# Patient Record
Sex: MALE | Race: WHITE | NOT HISPANIC OR LATINO | Employment: OTHER | URBAN - METROPOLITAN AREA
[De-identification: names, ages, dates, MRNs, and addresses within clinical notes are randomized per-mention and may not be internally consistent; named-entity substitution may affect disease eponyms.]

---

## 2017-01-01 ENCOUNTER — APPOINTMENT (OUTPATIENT)
Dept: GENERAL RADIOLOGY | Facility: HOSPITAL | Age: 62
End: 2017-01-01

## 2017-01-01 PROCEDURE — 71010 HC CHEST PA OR AP: CPT

## 2017-01-02 ENCOUNTER — APPOINTMENT (OUTPATIENT)
Dept: GENERAL RADIOLOGY | Facility: HOSPITAL | Age: 62
End: 2017-01-02

## 2017-01-02 PROCEDURE — 71010 HC CHEST PA OR AP: CPT

## 2017-01-03 ENCOUNTER — APPOINTMENT (OUTPATIENT)
Dept: GENERAL RADIOLOGY | Facility: HOSPITAL | Age: 62
End: 2017-01-03

## 2017-01-03 PROCEDURE — 71010 HC CHEST PA OR AP: CPT

## 2017-01-09 ENCOUNTER — TELEPHONE (OUTPATIENT)
Dept: CARDIAC SURGERY | Facility: CLINIC | Age: 62
End: 2017-01-09

## 2017-01-09 NOTE — TELEPHONE ENCOUNTER
I called Mr Bloom today to cancel his appointment in [facility] tomorrow because Dr De La Fuente will not be going because he has to do surgery in [place] tomorrow. I explained to him that Dr De La Fuente is not going to the [facility] anymore and offered to make him a [place] appointment. He was upset and said that this was the third time Dr De La Fuente had done this to him and that he was too sick right now to travel to [place]. He does not wish to make anymore appointments with Dr De La Fuente at this time. ls

## 2017-01-14 ENCOUNTER — APPOINTMENT (OUTPATIENT)
Dept: GENERAL RADIOLOGY | Facility: HOSPITAL | Age: 62
End: 2017-01-14

## 2017-01-14 ENCOUNTER — HOSPITAL ENCOUNTER (INPATIENT)
Facility: HOSPITAL | Age: 62
LOS: 7 days | Discharge: HOME | End: 2017-01-21
Attending: THORACIC SURGERY (CARDIOTHORACIC VASCULAR SURGERY) | Admitting: THORACIC SURGERY (CARDIOTHORACIC VASCULAR SURGERY)

## 2017-01-14 ENCOUNTER — ANESTHESIA EVENT (OUTPATIENT)
Dept: PERIOP | Facility: HOSPITAL | Age: 62
End: 2017-01-14

## 2017-01-14 DIAGNOSIS — J93.83 PNEUMOTHORAX, ACUTE: Primary | ICD-10-CM

## 2017-01-14 PROBLEM — J93.9 PNEUMOTHORAX: Status: ACTIVE | Noted: 2017-01-14

## 2017-01-14 LAB
ANION GAP SERPL CALCULATED.3IONS-SCNC: 8 MMOL/L (ref 3–11)
APTT PPP: 29.3 SECONDS (ref 24–31)
BASOPHILS # BLD AUTO: 0.02 10*3/MM3 (ref 0–0.2)
BASOPHILS NFR BLD AUTO: 0.2 % (ref 0–1)
BUN BLD-MCNC: 14 MG/DL (ref 9–23)
BUN/CREAT SERPL: 17.5 (ref 7–25)
CALCIUM SPEC-SCNC: 9.5 MG/DL (ref 8.7–10.4)
CHLORIDE SERPL-SCNC: 103 MMOL/L (ref 99–109)
CO2 SERPL-SCNC: 32 MMOL/L (ref 20–31)
CREAT BLD-MCNC: 0.8 MG/DL (ref 0.6–1.3)
D-LACTATE SERPL-SCNC: 0.8 MMOL/L (ref 0.5–2)
DEPRECATED RDW RBC AUTO: 54.9 FL (ref 37–54)
EOSINOPHIL # BLD AUTO: 0.43 10*3/MM3 (ref 0.1–0.3)
EOSINOPHIL NFR BLD AUTO: 3.9 % (ref 0–3)
ERYTHROCYTE [DISTWIDTH] IN BLOOD BY AUTOMATED COUNT: 15.8 % (ref 11.3–14.5)
GFR SERPL CREATININE-BSD FRML MDRD: 98 ML/MIN/1.73
GLUCOSE BLD-MCNC: 130 MG/DL (ref 70–100)
GLUCOSE BLDC GLUCOMTR-MCNC: 136 MG/DL (ref 70–130)
GLUCOSE BLDC GLUCOMTR-MCNC: 157 MG/DL (ref 70–130)
GLUCOSE BLDC GLUCOMTR-MCNC: 159 MG/DL (ref 70–130)
GLUCOSE BLDC GLUCOMTR-MCNC: 188 MG/DL (ref 70–130)
HBA1C MFR BLD: 7 % (ref 4.8–5.6)
HCT VFR BLD AUTO: 37.9 % (ref 38.9–50.9)
HGB BLD-MCNC: 12.2 G/DL (ref 13.1–17.5)
IMM GRANULOCYTES # BLD: 0.17 10*3/MM3 (ref 0–0.03)
IMM GRANULOCYTES NFR BLD: 1.6 % (ref 0–0.6)
INR PPP: 0.96
LYMPHOCYTES # BLD AUTO: 1.39 10*3/MM3 (ref 0.6–4.8)
LYMPHOCYTES NFR BLD AUTO: 12.7 % (ref 24–44)
MAGNESIUM SERPL-MCNC: 2 MG/DL (ref 1.3–2.7)
MCH RBC QN AUTO: 30.7 PG (ref 27–31)
MCHC RBC AUTO-ENTMCNC: 32.2 G/DL (ref 32–36)
MCV RBC AUTO: 95.2 FL (ref 80–99)
MONOCYTES # BLD AUTO: 0.55 10*3/MM3 (ref 0–1)
MONOCYTES NFR BLD AUTO: 5 % (ref 0–12)
NEUTROPHILS # BLD AUTO: 8.39 10*3/MM3 (ref 1.5–8.3)
NEUTROPHILS NFR BLD AUTO: 76.6 % (ref 41–71)
PHOSPHATE SERPL-MCNC: 3.5 MG/DL (ref 2.4–5.1)
PLATELET # BLD AUTO: 259 10*3/MM3 (ref 150–450)
PMV BLD AUTO: 10.2 FL (ref 6–12)
POTASSIUM BLD-SCNC: 4.1 MMOL/L (ref 3.5–5.5)
PROCALCITONIN SERPL-MCNC: 0.09 NG/ML
PROTHROMBIN TIME: 10.5 SECONDS (ref 9.6–11.5)
RBC # BLD AUTO: 3.98 10*6/MM3 (ref 4.2–5.76)
SODIUM BLD-SCNC: 143 MMOL/L (ref 132–146)
TSH SERPL DL<=0.05 MIU/L-ACNC: 1.41 MIU/ML (ref 0.35–5.35)
WBC NRBC COR # BLD: 10.95 10*3/MM3 (ref 3.5–10.8)

## 2017-01-14 PROCEDURE — 63710000001 INSULIN LISPRO (HUMAN) PER 5 UNITS: Performed by: INTERNAL MEDICINE

## 2017-01-14 PROCEDURE — 83605 ASSAY OF LACTIC ACID: CPT | Performed by: INTERNAL MEDICINE

## 2017-01-14 PROCEDURE — 84100 ASSAY OF PHOSPHORUS: CPT | Performed by: NURSE PRACTITIONER

## 2017-01-14 PROCEDURE — 85610 PROTHROMBIN TIME: CPT | Performed by: PHYSICIAN ASSISTANT

## 2017-01-14 PROCEDURE — 85730 THROMBOPLASTIN TIME PARTIAL: CPT | Performed by: PHYSICIAN ASSISTANT

## 2017-01-14 PROCEDURE — 25810000003 DEXTROSE-NACL PER 500 ML: Performed by: INTERNAL MEDICINE

## 2017-01-14 PROCEDURE — 85025 COMPLETE CBC W/AUTO DIFF WBC: CPT | Performed by: PHYSICIAN ASSISTANT

## 2017-01-14 PROCEDURE — 84443 ASSAY THYROID STIM HORMONE: CPT | Performed by: INTERNAL MEDICINE

## 2017-01-14 PROCEDURE — 25010000002 MORPHINE SULFATE (PF) 2 MG/ML SOLUTION: Performed by: PHYSICIAN ASSISTANT

## 2017-01-14 PROCEDURE — 80048 BASIC METABOLIC PNL TOTAL CA: CPT | Performed by: PHYSICIAN ASSISTANT

## 2017-01-14 PROCEDURE — 83735 ASSAY OF MAGNESIUM: CPT | Performed by: NURSE PRACTITIONER

## 2017-01-14 PROCEDURE — 84145 PROCALCITONIN (PCT): CPT | Performed by: INTERNAL MEDICINE

## 2017-01-14 PROCEDURE — 93010 ELECTROCARDIOGRAM REPORT: CPT | Performed by: INTERNAL MEDICINE

## 2017-01-14 PROCEDURE — 83036 HEMOGLOBIN GLYCOSYLATED A1C: CPT | Performed by: INTERNAL MEDICINE

## 2017-01-14 PROCEDURE — 25010000002 MORPHINE PER 10 MG: Performed by: PHYSICIAN ASSISTANT

## 2017-01-14 PROCEDURE — 82962 GLUCOSE BLOOD TEST: CPT

## 2017-01-14 PROCEDURE — 25010000002 PIPERACILLIN SOD-TAZOBACTAM PER 1 G: Performed by: INTERNAL MEDICINE

## 2017-01-14 PROCEDURE — 71010 HC CHEST PA OR AP: CPT

## 2017-01-14 PROCEDURE — 87040 BLOOD CULTURE FOR BACTERIA: CPT | Performed by: INTERNAL MEDICINE

## 2017-01-14 PROCEDURE — 93005 ELECTROCARDIOGRAM TRACING: CPT | Performed by: PHYSICIAN ASSISTANT

## 2017-01-14 PROCEDURE — 99223 1ST HOSP IP/OBS HIGH 75: CPT | Performed by: INTERNAL MEDICINE

## 2017-01-14 RX ORDER — DEXTROSE MONOHYDRATE 25 G/50ML
25 INJECTION, SOLUTION INTRAVENOUS
Status: CANCELLED | OUTPATIENT
Start: 2017-01-14

## 2017-01-14 RX ORDER — DEXTROSE AND SODIUM CHLORIDE 5; .9 G/100ML; G/100ML
100 INJECTION, SOLUTION INTRAVENOUS CONTINUOUS
Status: DISCONTINUED | OUTPATIENT
Start: 2017-01-14 | End: 2017-01-16

## 2017-01-14 RX ORDER — HYDROCODONE BITARTRATE AND ACETAMINOPHEN 7.5; 325 MG/1; MG/1
1 TABLET ORAL EVERY 4 HOURS PRN
Status: DISCONTINUED | OUTPATIENT
Start: 2017-01-14 | End: 2017-01-14

## 2017-01-14 RX ORDER — FAMOTIDINE 20 MG/1
20 TABLET, FILM COATED ORAL 2 TIMES DAILY
Status: DISCONTINUED | OUTPATIENT
Start: 2017-01-14 | End: 2017-01-17

## 2017-01-14 RX ORDER — MORPHINE SULFATE 2 MG/ML
2 INJECTION, SOLUTION INTRAMUSCULAR; INTRAVENOUS EVERY 4 HOURS PRN
Status: DISCONTINUED | OUTPATIENT
Start: 2017-01-14 | End: 2017-01-15

## 2017-01-14 RX ORDER — MORPHINE SULFATE 4 MG/ML
4 INJECTION, SOLUTION INTRAMUSCULAR; INTRAVENOUS EVERY 4 HOURS PRN
Status: DISCONTINUED | OUTPATIENT
Start: 2017-01-14 | End: 2017-01-15

## 2017-01-14 RX ORDER — NICOTINE POLACRILEX 4 MG
15 LOZENGE BUCCAL
Status: CANCELLED | OUTPATIENT
Start: 2017-01-14

## 2017-01-14 RX ORDER — HYDROCODONE BITARTRATE AND ACETAMINOPHEN 10; 325 MG/1; MG/1
1 TABLET ORAL EVERY 4 HOURS PRN
Status: DISCONTINUED | OUTPATIENT
Start: 2017-01-14 | End: 2017-01-15

## 2017-01-14 RX ORDER — SODIUM CHLORIDE 0.9 % (FLUSH) 0.9 %
1-10 SYRINGE (ML) INJECTION AS NEEDED
Status: DISCONTINUED | OUTPATIENT
Start: 2017-01-14 | End: 2017-01-21 | Stop reason: HOSPADM

## 2017-01-14 RX ADMIN — TAZOBACTAM SODIUM AND PIPERACILLIN SODIUM 4.5 G: 500; 4 INJECTION, SOLUTION INTRAVENOUS at 22:21

## 2017-01-14 RX ADMIN — MORPHINE SULFATE 2 MG: 2 INJECTION, SOLUTION INTRAMUSCULAR; INTRAVENOUS at 08:59

## 2017-01-14 RX ADMIN — MORPHINE SULFATE 2 MG: 2 INJECTION, SOLUTION INTRAMUSCULAR; INTRAVENOUS at 02:10

## 2017-01-14 RX ADMIN — MORPHINE SULFATE 2 MG: 2 INJECTION, SOLUTION INTRAMUSCULAR; INTRAVENOUS at 08:15

## 2017-01-14 RX ADMIN — DEXTROSE AND SODIUM CHLORIDE 100 ML/HR: 5; 900 INJECTION, SOLUTION INTRAVENOUS at 05:57

## 2017-01-14 RX ADMIN — MORPHINE SULFATE 4 MG: 4 INJECTION, SOLUTION INTRAMUSCULAR; INTRAVENOUS at 19:26

## 2017-01-14 RX ADMIN — MORPHINE SULFATE 2 MG: 2 INJECTION, SOLUTION INTRAMUSCULAR; INTRAVENOUS at 04:19

## 2017-01-14 RX ADMIN — HYDROCODONE POLISTIREX AND CHLORPHENIRAMINE POLISTIREX 5 ML: 10; 8 SUSPENSION, EXTENDED RELEASE ORAL at 16:05

## 2017-01-14 RX ADMIN — HYDROCODONE BITARTRATE AND ACETAMINOPHEN 1 TABLET: 10; 325 TABLET ORAL at 17:58

## 2017-01-14 RX ADMIN — HYDROCODONE BITARTRATE AND ACETAMINOPHEN 1 TABLET: 7.5; 325 TABLET ORAL at 05:56

## 2017-01-14 RX ADMIN — HYDROCODONE BITARTRATE AND ACETAMINOPHEN 1 TABLET: 7.5; 325 TABLET ORAL at 12:42

## 2017-01-14 RX ADMIN — DEXTROSE AND SODIUM CHLORIDE 100 ML/HR: 5; 900 INJECTION, SOLUTION INTRAVENOUS at 15:56

## 2017-01-14 RX ADMIN — FAMOTIDINE 20 MG: 20 TABLET ORAL at 17:58

## 2017-01-14 RX ADMIN — FAMOTIDINE 20 MG: 20 TABLET ORAL at 08:13

## 2017-01-14 RX ADMIN — INSULIN LISPRO 2 UNITS: 100 INJECTION, SOLUTION INTRAVENOUS; SUBCUTANEOUS at 12:42

## 2017-01-14 RX ADMIN — TAZOBACTAM SODIUM AND PIPERACILLIN SODIUM 4.5 G: 500; 4 INJECTION, SOLUTION INTRAVENOUS at 15:47

## 2017-01-14 RX ADMIN — INSULIN LISPRO 2 UNITS: 100 INJECTION, SOLUTION INTRAVENOUS; SUBCUTANEOUS at 08:13

## 2017-01-14 RX ADMIN — INSULIN LISPRO 2 UNITS: 100 INJECTION, SOLUTION INTRAVENOUS; SUBCUTANEOUS at 17:58

## 2017-01-14 RX ADMIN — FAMOTIDINE 20 MG: 20 TABLET ORAL at 04:19

## 2017-01-14 RX ADMIN — TAZOBACTAM SODIUM AND PIPERACILLIN SODIUM 4.5 G: 500; 4 INJECTION, SOLUTION INTRAVENOUS at 05:56

## 2017-01-14 RX ADMIN — HYDROCODONE BITARTRATE AND ACETAMINOPHEN 1 TABLET: 10; 325 TABLET ORAL at 22:21

## 2017-01-14 NOTE — PLAN OF CARE
Problem: Patient Care Overview (Adult)  Goal: Plan of Care Review  Outcome: Ongoing (interventions implemented as appropriate)    01/14/17 0309   Coping/Psychosocial Response Interventions   Plan Of Care Reviewed With patient;spouse   Patient Care Overview   Progress progress toward functional goals as expected   Outcome Evaluation   Outcome Summary/Follow up Plan pt admitted from outlying facility, on 2L NC, vitals stable. Labs and CXR obtained, CT surgery will see in am. Pain medications given prn, and is currently controlling pain.        Goal: Adult Individualization and Mutuality  Outcome: Ongoing (interventions implemented as appropriate)  Goal: Discharge Needs Assessment  Outcome: Ongoing (interventions implemented as appropriate)    Problem: Pain, Acute (Adult)  Goal: Identify Related Risk Factors and Signs and Symptoms  Outcome: Ongoing (interventions implemented as appropriate)  Goal: Acceptable Pain Control/Comfort Level  Outcome: Ongoing (interventions implemented as appropriate)

## 2017-01-14 NOTE — PROGRESS NOTES
"Intensive Care Follow-up     Hospital:  LOS: 0 days   Mr. Luis Bloom, 61 y.o. male is followed for:   <principal problem not specified>        Subjective   Interval History:  The admission note from this morning was reviewed and I have followed up with the patient. He believes that his voice is getting weaker and he is having a very painful cough. He does not feel short of breath.    The patient's relevant past medical, surgical and social history were reviewed and updated in Epic as appropriate.        Objective     Infusions:    dextrose 5 % and sodium chloride 0.9 % 100 mL/hr Last Rate: 100 mL/hr (01/14/17 0557)     Medications:    famotidine 20 mg Oral BID   insulin lispro 0-9 Units Subcutaneous 4x Daily With Meals & Nightly   piperacillin-tazobactam 4.5 g Intravenous Q8H       Vital Sign Min/Max for last 24 hours  Temp  Min: 97.7 °F (36.5 °C)  Max: 99.2 °F (37.3 °C)   BP  Min: 97/68  Max: 146/87   Pulse  Min: 82  Max: 101   Resp  Min: 16  Max: 22   SpO2  Min: 90 %  Max: 97 %   Flow (L/min)  Min: 2  Max: 2       Input/Output for last 24 hour shift  01/13 0701 - 01/14 0700  In: 100   Out: 200 [Urine:200]      Objective:  General Appearance:  Uncomfortable, well-appearing and in no acute distress.    Vital signs: (most recent): Blood pressure 116/84, pulse 84, temperature 98.9 °F (37.2 °C), temperature source Oral, resp. rate 16, height 68\" (172.7 cm), weight 238 lb (108 kg), SpO2 93 %.  No fever.    Output: Producing urine.    HEENT: Normal HEENT exam.    Lungs:  Normal respiratory rate and normal effort.  He is not in respiratory distress.  Breath sounds clear to auscultation.  No stridor.  No wheezes, rales, rhonchi or decreased breath sounds.    Heart: Normal rate.  Regular rhythm.  S1 normal and S2 normal.  No murmur, gallop or friction rub.   Chest: (There is subcutaneous air in the right chest and back with extension into the neck.)  Abdomen: Abdomen is soft and non-distended.  Bowel sounds are normal.  "  There is no abdominal tenderness.   There is no mass.   Extremities: Normal range of motion.    Neurological: Patient is alert and oriented to person, place and time.    Pupils:  Pupils are equal, round, and reactive to light.  Pupils are equal.   Skin:  Warm.  No rash, ecchymosis, cyanosis or ulceration.               Results from last 7 days  Lab Units 01/14/17  0339   WBC 10*3/mm3 10.95*   HEMOGLOBIN g/dL 12.2*   PLATELETS 10*3/mm3 259       Results from last 7 days  Lab Units 01/14/17  0339   SODIUM mmol/L 143   POTASSIUM mmol/L 4.1   TOTAL CO2 mmol/L 32.0*   BUN mg/dL 14   CREATININE mg/dL 0.80   MAGNESIUM mg/dL 2.0   PHOSPHORUS mg/dL 3.5   GLUCOSE mg/dL 130*     Estimated Creatinine Clearance: 112.6 mL/min (by C-G formula based on Cr of 0.8).          I reviewed the patient's results and images.     Assessment/Plan   Impression      Active Problems:    Pneumothorax       Plan        I will increase his pain medication as well as provide a cough suppressant to see if we can make him feel bit better.  He does not need urgent intervention for the subcutaneous air pneumothorax at this time. I have reviewed the note by CT surgery today.  He will stay in the intensive care unit for close monitoring.  Orders of been placed for his new medications as mentioned above and chest x-ray has been ordered for tomorrow morning.       I discussed the patient's findings and my recommendations with patient, family and nursing staff         Issa Neville MD, [facility]  Pulmonary and Critical Care Medicine  01/14/17 3:43 PM

## 2017-01-14 NOTE — IP AVS SNAPSHOT
AFTER VISIT SUMMARY             Luis Bloom           About your hospitalization     You were admitted on:  January 14, 2017 You last received care in the:  [facility]       Procedures & Surgeries      Procedure(s) (LRB):  BRONCHOSCOPY THORACOTOMY (Right)     1/14/2017 - 1/15/2017     Surgeon(s):  MD Allan Michaels MD  -------------------      Medications    If you or your caregiver advised us that you are currently taking a medication and that medication is marked below as “Resume”, this simply indicates that we have reviewed those medications to make sure our new therapy recommendations do not interfere.  If you have concerns about medications other than those new ones which we are prescribing today, please consult the physician who prescribed them (or your primary physician).  Our review of your home medications is not meant to indicate that we are directing their use.             Your Medications      CONTINUE taking these medications     amLODIPine 5 MG tablet   Take 5 mg by mouth Daily.   Last time this was given:  1/21/2017  8:53 AM   Commonly known as:  NORVASC           aspirin 81 MG EC tablet   Take 81 mg by mouth Daily.   Last time this was given:  1/21/2017  8:52 AM           atenolol 25 MG tablet   Take 25 mg by mouth Daily.   Last time this was given:  1/21/2017  8:52 AM   Commonly known as:  TENORMIN           atorvastatin 40 MG tablet   Take 40 mg by mouth Daily.   Last time this was given:  1/21/2017  8:52 AM   Commonly known as:  LIPITOR           BYDUREON 2 MG pen-injector   Inject 2 mg under the skin Every 7 (Seven) Days. Pt stated that has been receiving samples   Generic drug:  Exenatide ER           diazePAM 5 MG tablet   Take 5 mg by mouth Every 8 (Eight) Hours As Needed for anxiety. Patient stated not really taking, has only taken 3 or 4 tablets total since prescribed   Commonly known as:  VALIUM           hydrochlorothiazide 25 MG tablet   Take 25 mg  by mouth Daily.   Commonly known as:  HYDRODIURIL           HYDROcodone-acetaminophen  MG per tablet   Take  tablets by mouth Every 6 (Six) Hours As Needed for moderate pain (4-6).   Last time this was given:  1/15/2017 12:19 PM   Commonly known as:  NORCO           ipratropium-albuterol 0.5-2.5 mg/mL nebulizer   Take 3 mL by nebulization Every 6 (Six) Hours As Needed for wheezing.   Last time this was given:  1/21/2017 12:23 PM   Commonly known as:  DUO-NEB           meloxicam 15 MG tablet   Take 15 mg by mouth Daily.   Last time this was given:  1/21/2017  8:52 AM   Commonly known as:  MOBIC           raNITIdine 300 MG tablet   Take 300 mg by mouth 2 (Two) Times a Day.   Commonly known as:  ZANTAC                      Your Medications      Your Medication List           Morning Noon Evening Bedtime As Needed    amLODIPine 5 MG tablet   Take 5 mg by mouth Daily.   Commonly known as:  NORVASC                                aspirin 81 MG EC tablet   Take 81 mg by mouth Daily.                                atenolol 25 MG tablet   Take 25 mg by mouth Daily.   Commonly known as:  TENORMIN                                atorvastatin 40 MG tablet   Take 40 mg by mouth Daily.   Commonly known as:  LIPITOR                                BYDUREON 2 MG pen-injector   Inject 2 mg under the skin Every 7 (Seven) Days. Pt stated that has been receiving samples   Generic drug:  Exenatide ER                                diazePAM 5 MG tablet   Take 5 mg by mouth Every 8 (Eight) Hours As Needed for anxiety. Patient stated not really taking, has only taken 3 or 4 tablets total since prescribed   Commonly known as:  VALIUM                                hydrochlorothiazide 25 MG tablet   Take 25 mg by mouth Daily.   Commonly known as:  HYDRODIURIL                                HYDROcodone-acetaminophen  MG per tablet   Take  tablets by mouth Every 6 (Six) Hours As Needed for moderate pain (4-6).   Commonly  known as:  NORCO                                ipratropium-albuterol 0.5-2.5 mg/mL nebulizer   Take 3 mL by nebulization Every 6 (Six) Hours As Needed for wheezing.   Commonly known as:  DUO-NEB                                meloxicam 15 MG tablet   Take 15 mg by mouth Daily.   Commonly known as:  MOBIC                                raNITIdine 300 MG tablet   Take 300 mg by mouth 2 (Two) Times a Day.   Commonly known as:  ZANTAC                                         Instructions for After Discharge        Other Instructions     Change Dressing       Dressing change: as needed       Discharge Instructions       If patient has difficulty breathing he should present to ER for evaluation             Discharge References/Attachments     PNEUMOTHORAX (ENGLISH)       Follow-ups for After Discharge        Scheduled Appointments         You need to make a follow up appointment with Dr De La Fuente in 2 weeks.  Sanjay De La Fuente MD  [address] [place] [address], [address]  [place] [address]  [phone]           [employer] Signup     Our records indicate that you have an active [employer] account.    You can view your After Visit Summary by going to [facility] and logging in with your [employer] username and password.  If you don't have a [employer] username and password but a parent or guardian has access to your record, the parent or guardian should login with their own [employer] username and password and access your record to view the After Visit Summary.    If you have questions, you can email [email or web address] or call [phone] to talk to our [employer] staff.  Remember, [employer] is NOT to be used for urgent needs.  For medical emergencies, dial 911.           Summary of Your Hospitalization        Reason for Hospitalization     Your primary diagnosis was:  Not on File    Your diagnoses also included:  Collapsed Lung      Care Providers     Provider Service Role Specialty    Sanjay De La Fuente MD  Cardiothoracic Surgery Attending Provider Cardiothoracic Surgery    Sanjay De La Fuente MD Cardiothoracic Surgery Surgeon  Cardiothoracic Surgery      Your Allergies  Date Reviewed: 1/21/2017    Allergen Reactions    Fish-Derived Products Not Noted      Patient Belongings Returned     Document Return of Belongings Flowsheet     Were the patient bedside belongings sent home?   --   Belongings Retrieved from Security & Sent Home   --    Belongings Sent to Safe   --   Medications Retrieved from Pharmacy & Sent Home   --              More Information      Pneumothorax  A pneumothorax, commonly called a collapsed lung, is a condition in which air leaks from a lung and builds up in the space between the lung and the chest wall (pleural space). The air in a pneumothorax is trapped outside the lung and takes up space, preventing the lung from fully expanding. This is a condition that usually occurs suddenly. The buildup of air may be small or large. A small pneumothorax may go away on its own. When a pneumothorax is larger, it will often require medical treatment and hospitalization.   CAUSES   A pneumothorax can sometimes happen quickly with no apparent cause. People with underlying lung problems, particularly COPD or emphysema, are at higher risk of pneumothorax. However, pneumothorax can happen quickly even in people with no prior known lung problems. Trauma, surgery, medical procedures, or injury to the chest wall can also cause a pneumothorax.  SIGNS AND SYMPTOMS   Sometimes a pneumothorax will have no symptoms. When symptoms are present, they can include:  · Chest pain.  · Shortness of breath.  · Increased rate of breathing.  · Bluish color to your lips or skin (cyanosis).  DIAGNOSIS   Pneumothorax is usually diagnosed by a chest X-ray or chest CT scan. Your health care provider will also take a medical history and perform a physical exam to determine why you may have a pneumothorax.  TREATMENT   A small pneumothorax  may go away on its own without treatment. Extra oxygen can sometimes help a small pneumothorax go away more quickly. For a larger pneumothorax or a pneumothorax that is causing symptoms, a procedure is usually needed to drain the air. In some cases, the health care provider may drain the air using a needle. In other cases, a chest tube may be inserted into the pleural space. A chest tube is a small tube placed between the ribs and into the pleural space. This removes the extra air and allows the lung to expand back to its normal size. A large pneumothorax will usually require a hospital stay. If there is ongoing air leakage into the pleural space, then the chest tube may need to remain in place for several days until the air leak has healed. In some cases, surgery may be needed.   HOME CARE INSTRUCTIONS   · Only take over-the-counter or prescription medicines as directed by your health care provider.  · If a cough or pain makes it difficult for you to sleep at night, try sleeping in a semi-upright position in a recliner or by using 2 or 3 pillows.  · Rest and limit activity as directed by your health care provider.  · If you had a chest tube and it was removed, ask your health care provider when it is okay to remove the dressing. Until your health care provider says you can remove the dressing, do not allow it to get wet.  · Do not smoke. Smoking is a risk factor for pneumothorax.  · Do not fly in an airplane or scuba dive until your health care provider says it is okay.  · Follow up with your health care provider as directed.  SEEK IMMEDIATE MEDICAL CARE IF:   · You have increasing chest pain or shortness of breath.  · You have a cough that is not controlled with suppressants.  · You begin coughing up blood.  · You have pain that is getting worse or is not controlled with medicines.  · You cough up thick, discolored mucus (sputum) that is yellow to green in color.  · You have redness, increasing pain, or discharge  at the site where a chest tube had been in place (if your pneumothorax was treated with a chest tube).  · The site where your chest tube was located opens up.  · You feel air coming out of the site where the chest tube was placed.  · You have a fever or persistent symptoms for more than 2-3 days.  · You have a fever and your symptoms suddenly get worse.  MAKE SURE YOU:   · Understand these instructions.  · Will watch your condition.  · Will get help right away if you are not doing well or get worse.     This information is not intended to replace advice given to you by your health care provider. Make sure you discuss any questions you have with your health care provider.     Document Released: 12/18/2006 Document Revised: 10/08/2014 Document Reviewed: 07/17/2014  [employer] Interactive Patient Education ©2016 [employer]         PREVENTING SURGICAL SITE INFECTIONS     Surgical Site Infections FAQs  What is a Surgical Site Infection (SSI)?  A surgical site infection is an infection that occurs after surgery in the part of the body where the surgery took place. Most patients who have surgery do not develop an infection. However, infections develop in about 1 to 3 out of every 100 patients who have surgery.  Some of the common symptoms of a surgical site infection are:  · Redness and pain around the area where you had surgery  · Drainage of cloudy fluid from your surgical wound  · Fever  Can SSIs be treated?  Yes. Most surgical site infections can be treated with antibiotics. The antibiotic given to you depends on the bacteria (germs) causing the infection. Sometimes patients with SSIs also need another surgery to treat the infection.  What are some of the things that hospitals are doing to prevent SSIs?  To prevent SSIs, doctors, nurses, and other healthcare providers:  · Clean their hands and arms up to their elbows with an antiseptic agent just before the surgery.  · Clean their hands with soap and water or an  alcohol-based hand rub before and after caring for each patient.  · May remove some of your hair immediately before your surgery using electric clippers if the hair is in the same area where the procedure will occur. They should not shave you with a razor.  · Wear special hair covers, masks, gowns, and gloves during surgery to keep the surgery area clean.  · Give you antibiotics before your surgery starts. In most cases, you should get antibiotics within 60 minutes before the surgery starts and the antibiotics should be stopped within 24 hours after surgery.  · Clean the skin at the site of your surgery with a special soap that kills germs.  What can I do to help prevent SSIs?  Before your surgery:  · Tell your doctor about other medical problems you may have. Health problems such as allergies, diabetes, and obesity could affect your surgery and your treatment.  · Quit smoking. Patients who smoke get more infections. Talk to your doctor about how you can quit before your surgery.  · Do not shave near where you will have surgery. Shaving with a razor can irritate your skin and make it easier to develop an infection.  At the time of your surgery:  · Speak up if someone tries to shave you with a razor before surgery. Ask why you need to be shaved and talk with your surgeon if you have any concerns.  · Ask if you will get antibiotics before surgery.  After your surgery:  · Make sure that your healthcare providers clean their hands before examining you, either with soap and water or an alcohol-based hand rub.    If you do not see your providers clean their hands, please ask them to do so.  · Family and friends who visit you should not touch the surgical wound or dressings.  · Family and friends should clean their hands with soap and water or an alcohol-based hand rub before and after visiting you. If you do not see them clean their hands, ask them to clean their hands.  What do I need to do when I go home from the  hospital?  · Before you go home, your doctor or nurse should explain everything you need to know about taking care of your wound. Make sure you understand how to care for your wound before you leave the hospital.  · Always clean your hands before and after caring for your wound.  · Before you go home, make sure you know who to contact if you have questions or problems after you get home.  · If you have any symptoms of an infection, such as redness and pain at the surgery site, drainage, or fever, call your doctor immediately.  If you have additional questions, please ask your doctor or nurse.  Developed and co-sponsored by The Society for Healthcare Epidemiology of [place] (SHEA); Infectious Diseases Society of [place] (IDSA); American Hospital Association; Association for Professionals in Infection Control and Epidemiology (APIC); Centers for Disease Control and Prevention (CDC); and The Joint Commission.     This information is not intended to replace advice given to you by your health care provider. Make sure you discuss any questions you have with your health care provider.     Document Released: 12/23/2014 Document Revised: 01/08/2016 Document Reviewed: 03/02/2016  [employer] Interactive Patient Education ©2016 [employer]             SYMPTOMS OF A STROKE    Call 911 or have someone take you to the Emergency Department if you have any of the following:    · Sudden numbness or weakness of your face, arm or leg especially on one side of the body  · Sudden confusion, diffiiculty speaking or trouble understanding   · Changes in your vision or loss of sight in one eye  · Sudden severe headache with no known cause  · sudden dizziness, trouble walking, loss of balance or coordination    It is important to seek emergency care right away if you have further stroke symptoms. If you get emergency help quickly, the powerful clot-dissolving medicines can reduce the disabilities caused by a stroke.     For more  information:    American Stroke Association  6-294-2-STROKE  www.strokeassociation.org           IF YOU SMOKE OR USE TOBACCO PLEASE READ THE FOLLOWING:    Why is smoking bad for me?  Smoking increases the risk of heart disease, lung disease, vascular disease, stroke, and cancer.     If you smoke, STOP!    If you would like more information on quitting smoking, please visit the [employer] website: [email or web address]-[email or web address]/smoke   This link will provide additional resources including the QUIT line and the Beat the Pack support groups.     For more information:    American Cancer Society  ([phone] 152-5919    American Heart Association  1-674.405.4098               YOU ARE THE MOST IMPORTANT FACTOR IN YOUR RECOVERY.     Follow all instructions carefully.     I have reviewed my discharge instructions with my nurse, including the following information, if applicable:     Information about my illness and diagnosis   Follow up appointments (including lab draws)   Wound Care   Equipment Needs   Medications (new and continuing) along with side effects   Preventative information such as vaccines and smoking cessations   Diet   Pain   I know when to contact my Doctor's office or seek emergency care      I want my nurse to describe the side effects of my medications: YES NO   If the answer is no, I understand the side effects of my medications: YES NO   My nurse described the side effects of my medications in a way that I could understand: YES NO   I have taken my personal belongings and my own medications with me at discharge: YES NO            I have received this information and my questions have been answered. I have discussed any concerns I see with this plan with the nurse or physician. I understand these instructions.    Signature of Patient or Responsible Person: _____________________________________    Date: _________________  Time: __________________    Signature of Healthcare  Provider: _______________________________________  Date: _________________  Time: __________________

## 2017-01-14 NOTE — PLAN OF CARE
Problem: Patient Care Overview (Adult)  Goal: Plan of Care Review  Outcome: Ongoing (interventions implemented as appropriate)  Goal: Adult Individualization and Mutuality  Outcome: Ongoing (interventions implemented as appropriate)  Goal: Discharge Needs Assessment  Outcome: Ongoing (interventions implemented as appropriate)    Problem: Pain, Acute (Adult)  Goal: Identify Related Risk Factors and Signs and Symptoms  Outcome: Ongoing (interventions implemented as appropriate)  Goal: Acceptable Pain Control/Comfort Level  Outcome: Ongoing (interventions implemented as appropriate)    Problem: Pressure Ulcer Risk (Vazquez Scale) (Adult,Obstetrics,Pediatric)  Goal: Identify Related Risk Factors and Signs and Symptoms  Outcome: Ongoing (interventions implemented as appropriate)  Goal: Skin Integrity  Outcome: Ongoing (interventions implemented as appropriate)

## 2017-01-14 NOTE — PROGRESS NOTES
"   LOS: 0 days   Patient Care Team:  Meir Betancur MD as PCP - General (Family Medicine)    Subjective the patient returns today for follow-up. He is been having a significant amount of pain in his right incision. He has been coughing a great deal.    Objective    Vital Sign Min/Max for last 24 hours  Temp  Min: 98.5 °F (36.9 °C)  Max: 99.2 °F (37.3 °C)   BP  Min: 98/64  Max: 146/87   Pulse  Min: 86  Max: 101   Resp  Min: 18  Max: 22   SpO2  Min: 90 %  Max: 97 %   Flow (L/min)  Min: 2  Max: 2   Weight  Min: 238 lb (108 kg)  Max: 238 lb (108 kg)     Flowsheet Rows         First Filed Value    Admission Height  68\" (172.7 cm) Documented at 01/14/2017 0053    Admission Weight  238 lb (108 kg) Documented at 01/14/2017 0053          Physical Exam:    Wound: Subcutaneous air. Incisions healing well    Pulses:     Mediastinal and Chest Tube Drainage:       Results Review:     Results from last 7 days  Lab Units 01/14/17  0339   WBC 10*3/mm3 10.95*   HEMOGLOBIN g/dL 12.2*   HEMATOCRIT % 37.9*   PLATELETS 10*3/mm3 259       Results from last 7 days  Lab Units 01/14/17  0339   SODIUM mmol/L 143   POTASSIUM mmol/L 4.1   CHLORIDE mmol/L 103   TOTAL CO2 mmol/L 32.0*   BUN mg/dL 14   CREATININE mg/dL 0.80   GLUCOSE mg/dL 130*   CALCIUM mg/dL 9.5             Assessment    Active Problems:    Pneumothorax      Chest x-ray does not reveal a distinct pneumothorax. Of reviewed this with Dr. Barry as well as examine the patient. He appears to have lung herniation on the right. We will plan on taking the patient back to surgery and an closing his ribs again. I explained that to him in detail he is fully informed and agreeable.        Sanjay De La Fuente MD  01/14/17  8:44 AM      Please note that portions of this note were completed with a voice recognition program. Efforts were made to edit the dictations, but words may be mistranscribed  "

## 2017-01-15 ENCOUNTER — ANESTHESIA (OUTPATIENT)
Dept: PERIOP | Facility: HOSPITAL | Age: 62
End: 2017-01-15

## 2017-01-15 ENCOUNTER — APPOINTMENT (OUTPATIENT)
Dept: GENERAL RADIOLOGY | Facility: HOSPITAL | Age: 62
End: 2017-01-15

## 2017-01-15 LAB
ABO GROUP BLD: NORMAL
ANION GAP SERPL CALCULATED.3IONS-SCNC: 11 MMOL/L (ref 3–11)
BLD GP AB SCN SERPL QL: NEGATIVE
BUN BLD-MCNC: 10 MG/DL (ref 9–23)
BUN/CREAT SERPL: 12.5 (ref 7–25)
CALCIUM SPEC-SCNC: 8.9 MG/DL (ref 8.7–10.4)
CHLORIDE SERPL-SCNC: 104 MMOL/L (ref 99–109)
CO2 SERPL-SCNC: 28 MMOL/L (ref 20–31)
CREAT BLD-MCNC: 0.8 MG/DL (ref 0.6–1.3)
DEPRECATED RDW RBC AUTO: 55.7 FL (ref 37–54)
ERYTHROCYTE [DISTWIDTH] IN BLOOD BY AUTOMATED COUNT: 16.1 % (ref 11.3–14.5)
GFR SERPL CREATININE-BSD FRML MDRD: 98 ML/MIN/1.73
GLUCOSE BLD-MCNC: 144 MG/DL (ref 70–100)
GLUCOSE BLDC GLUCOMTR-MCNC: 146 MG/DL (ref 70–130)
GLUCOSE BLDC GLUCOMTR-MCNC: 165 MG/DL (ref 70–130)
GLUCOSE BLDC GLUCOMTR-MCNC: 236 MG/DL (ref 70–130)
HCT VFR BLD AUTO: 36.3 % (ref 38.9–50.9)
HGB BLD-MCNC: 11.5 G/DL (ref 13.1–17.5)
INR PPP: 1
MCH RBC QN AUTO: 30.1 PG (ref 27–31)
MCHC RBC AUTO-ENTMCNC: 31.7 G/DL (ref 32–36)
MCV RBC AUTO: 95 FL (ref 80–99)
PLATELET # BLD AUTO: 229 10*3/MM3 (ref 150–450)
PMV BLD AUTO: 9.8 FL (ref 6–12)
POTASSIUM BLD-SCNC: 4.1 MMOL/L (ref 3.5–5.5)
PROTHROMBIN TIME: 10.9 SECONDS (ref 9.6–11.5)
RBC # BLD AUTO: 3.82 10*6/MM3 (ref 4.2–5.76)
RH BLD: NEGATIVE
SODIUM BLD-SCNC: 143 MMOL/L (ref 132–146)
WBC NRBC COR # BLD: 10.7 10*3/MM3 (ref 3.5–10.8)

## 2017-01-15 PROCEDURE — 25010000002 CEFUROXIME PER 750 MG: Performed by: ANESTHESIOLOGY

## 2017-01-15 PROCEDURE — 0BJ08ZZ INSPECTION OF TRACHEOBRONCHIAL TREE, VIA NATURAL OR ARTIFICIAL OPENING ENDOSCOPIC: ICD-10-PCS | Performed by: THORACIC SURGERY (CARDIOTHORACIC VASCULAR SURGERY)

## 2017-01-15 PROCEDURE — 25010000002 FENTANYL CITRATE (PF) 100 MCG/2ML SOLUTION: Performed by: ANESTHESIOLOGY

## 2017-01-15 PROCEDURE — 0WQ80ZZ REPAIR CHEST WALL, OPEN APPROACH: ICD-10-PCS | Performed by: THORACIC SURGERY (CARDIOTHORACIC VASCULAR SURGERY)

## 2017-01-15 PROCEDURE — 85610 PROTHROMBIN TIME: CPT | Performed by: PHYSICIAN ASSISTANT

## 2017-01-15 PROCEDURE — 86901 BLOOD TYPING SEROLOGIC RH(D): CPT

## 2017-01-15 PROCEDURE — 71010 HC CHEST PA OR AP: CPT

## 2017-01-15 PROCEDURE — 31622 DX BRONCHOSCOPE/WASH: CPT | Performed by: THORACIC SURGERY (CARDIOTHORACIC VASCULAR SURGERY)

## 2017-01-15 PROCEDURE — P9041 ALBUMIN (HUMAN),5%, 50ML: HCPCS | Performed by: ANESTHESIOLOGY

## 2017-01-15 PROCEDURE — 32120 RE-EXPLORATION OF CHEST: CPT | Performed by: THORACIC SURGERY (CARDIOTHORACIC VASCULAR SURGERY)

## 2017-01-15 PROCEDURE — 25010000002 HEPARIN (PORCINE) PER 1000 UNITS: Performed by: PHYSICIAN ASSISTANT

## 2017-01-15 PROCEDURE — 25010000002 PROPOFOL 10 MG/ML EMULSION: Performed by: ANESTHESIOLOGY

## 2017-01-15 PROCEDURE — 99232 SBSQ HOSP IP/OBS MODERATE 35: CPT | Performed by: INTERNAL MEDICINE

## 2017-01-15 PROCEDURE — 25010000002 NEOSTIGMINE PER 0.5 MG: Performed by: ANESTHESIOLOGY

## 2017-01-15 PROCEDURE — 25810000003 DEXTROSE-NACL PER 500 ML: Performed by: INTERNAL MEDICINE

## 2017-01-15 PROCEDURE — 25010000002 ALBUMIN HUMAN 5% PER 50 ML: Performed by: ANESTHESIOLOGY

## 2017-01-15 PROCEDURE — 86850 RBC ANTIBODY SCREEN: CPT

## 2017-01-15 PROCEDURE — C1755 CATHETER, INTRASPINAL: HCPCS | Performed by: THORACIC SURGERY (CARDIOTHORACIC VASCULAR SURGERY)

## 2017-01-15 PROCEDURE — 86900 BLOOD TYPING SEROLOGIC ABO: CPT

## 2017-01-15 PROCEDURE — 25010000002 CEFUROXIME PER 750 MG: Performed by: PHYSICIAN ASSISTANT

## 2017-01-15 PROCEDURE — 25010000002 HYDROMORPHONE PER 4 MG: Performed by: ANESTHESIOLOGY

## 2017-01-15 PROCEDURE — 85027 COMPLETE CBC AUTOMATED: CPT | Performed by: PHYSICIAN ASSISTANT

## 2017-01-15 PROCEDURE — 82962 GLUCOSE BLOOD TEST: CPT

## 2017-01-15 PROCEDURE — 80048 BASIC METABOLIC PNL TOTAL CA: CPT | Performed by: PHYSICIAN ASSISTANT

## 2017-01-15 PROCEDURE — 25010000002 PIPERACILLIN SOD-TAZOBACTAM PER 1 G: Performed by: INTERNAL MEDICINE

## 2017-01-15 PROCEDURE — 25010000002 MORPHINE PER 10 MG: Performed by: PHYSICIAN ASSISTANT

## 2017-01-15 PROCEDURE — 25010000002 MIDAZOLAM PER 1 MG: Performed by: ANESTHESIOLOGY

## 2017-01-15 PROCEDURE — 25010000002 DEXAMETHASONE PER 1 MG: Performed by: ANESTHESIOLOGY

## 2017-01-15 PROCEDURE — 25010000002 MORPHINE SULFATE (PF) 2 MG/ML SOLUTION: Performed by: PHYSICIAN ASSISTANT

## 2017-01-15 PROCEDURE — 25010000002 ONDANSETRON PER 1 MG: Performed by: ANESTHESIOLOGY

## 2017-01-15 PROCEDURE — 25010000002 ONDANSETRON PER 1 MG: Performed by: PHYSICIAN ASSISTANT

## 2017-01-15 RX ORDER — HYDROCODONE BITARTRATE AND ACETAMINOPHEN 7.5; 325 MG/1; MG/1
1 TABLET ORAL EVERY 4 HOURS PRN
Status: DISCONTINUED | OUTPATIENT
Start: 2017-01-15 | End: 2017-01-21 | Stop reason: HOSPADM

## 2017-01-15 RX ORDER — SODIUM CHLORIDE 9 MG/ML
30 INJECTION, SOLUTION INTRAVENOUS CONTINUOUS
Status: DISCONTINUED | OUTPATIENT
Start: 2017-01-15 | End: 2017-01-17

## 2017-01-15 RX ORDER — MORPHINE SULFATE 10 MG/ML
4 INJECTION INTRAMUSCULAR; INTRAVENOUS; SUBCUTANEOUS EVERY 4 HOURS PRN
Status: DISCONTINUED | OUTPATIENT
Start: 2017-01-15 | End: 2017-01-17 | Stop reason: CLARIF

## 2017-01-15 RX ORDER — ONDANSETRON 4 MG/1
4 TABLET, FILM COATED ORAL EVERY 6 HOURS PRN
Status: DISCONTINUED | OUTPATIENT
Start: 2017-01-15 | End: 2017-01-21 | Stop reason: HOSPADM

## 2017-01-15 RX ORDER — NALOXONE HCL 0.4 MG/ML
0.4 VIAL (ML) INJECTION
Status: DISCONTINUED | OUTPATIENT
Start: 2017-01-15 | End: 2017-01-17 | Stop reason: CLARIF

## 2017-01-15 RX ORDER — FENTANYL CITRATE 50 UG/ML
INJECTION, SOLUTION INTRAMUSCULAR; INTRAVENOUS AS NEEDED
Status: DISCONTINUED | OUTPATIENT
Start: 2017-01-15 | End: 2017-01-15 | Stop reason: SURG

## 2017-01-15 RX ORDER — FENTANYL CITRATE 50 UG/ML
50 INJECTION, SOLUTION INTRAMUSCULAR; INTRAVENOUS
Status: DISCONTINUED | OUTPATIENT
Start: 2017-01-15 | End: 2017-01-15 | Stop reason: HOSPADM

## 2017-01-15 RX ORDER — SODIUM CHLORIDE, SODIUM LACTATE, POTASSIUM CHLORIDE, CALCIUM CHLORIDE 600; 310; 30; 20 MG/100ML; MG/100ML; MG/100ML; MG/100ML
INJECTION, SOLUTION INTRAVENOUS CONTINUOUS PRN
Status: DISCONTINUED | OUTPATIENT
Start: 2017-01-15 | End: 2017-01-15 | Stop reason: SURG

## 2017-01-15 RX ORDER — HYDROMORPHONE HYDROCHLORIDE 1 MG/ML
0.5 INJECTION, SOLUTION INTRAMUSCULAR; INTRAVENOUS; SUBCUTANEOUS
Status: DISCONTINUED | OUTPATIENT
Start: 2017-01-15 | End: 2017-01-15 | Stop reason: HOSPADM

## 2017-01-15 RX ORDER — ONDANSETRON 2 MG/ML
INJECTION INTRAMUSCULAR; INTRAVENOUS AS NEEDED
Status: DISCONTINUED | OUTPATIENT
Start: 2017-01-15 | End: 2017-01-15 | Stop reason: SURG

## 2017-01-15 RX ORDER — ROCURONIUM BROMIDE 10 MG/ML
INJECTION, SOLUTION INTRAVENOUS AS NEEDED
Status: DISCONTINUED | OUTPATIENT
Start: 2017-01-15 | End: 2017-01-15 | Stop reason: SURG

## 2017-01-15 RX ORDER — DEXAMETHASONE SODIUM PHOSPHATE 4 MG/ML
INJECTION, SOLUTION INTRA-ARTICULAR; INTRALESIONAL; INTRAMUSCULAR; INTRAVENOUS; SOFT TISSUE AS NEEDED
Status: DISCONTINUED | OUTPATIENT
Start: 2017-01-15 | End: 2017-01-15 | Stop reason: SURG

## 2017-01-15 RX ORDER — LIDOCAINE HYDROCHLORIDE 10 MG/ML
INJECTION, SOLUTION INFILTRATION; PERINEURAL AS NEEDED
Status: DISCONTINUED | OUTPATIENT
Start: 2017-01-15 | End: 2017-01-15 | Stop reason: SURG

## 2017-01-15 RX ORDER — MIDAZOLAM HYDROCHLORIDE 1 MG/ML
INJECTION INTRAMUSCULAR; INTRAVENOUS AS NEEDED
Status: DISCONTINUED | OUTPATIENT
Start: 2017-01-15 | End: 2017-01-15 | Stop reason: SURG

## 2017-01-15 RX ORDER — ALBUMIN, HUMAN INJ 5% 5 %
SOLUTION INTRAVENOUS CONTINUOUS PRN
Status: DISCONTINUED | OUTPATIENT
Start: 2017-01-15 | End: 2017-01-15 | Stop reason: SURG

## 2017-01-15 RX ORDER — NALOXONE HCL 0.4 MG/ML
0.1 VIAL (ML) INJECTION
Status: DISCONTINUED | OUTPATIENT
Start: 2017-01-15 | End: 2017-01-15 | Stop reason: SDUPTHER

## 2017-01-15 RX ORDER — PROPOFOL 10 MG/ML
VIAL (ML) INTRAVENOUS AS NEEDED
Status: DISCONTINUED | OUTPATIENT
Start: 2017-01-15 | End: 2017-01-15 | Stop reason: SURG

## 2017-01-15 RX ORDER — ONDANSETRON 2 MG/ML
4 INJECTION INTRAMUSCULAR; INTRAVENOUS EVERY 6 HOURS PRN
Status: DISCONTINUED | OUTPATIENT
Start: 2017-01-15 | End: 2017-01-21 | Stop reason: HOSPADM

## 2017-01-15 RX ORDER — HEPARIN SODIUM 5000 [USP'U]/ML
5000 INJECTION, SOLUTION INTRAVENOUS; SUBCUTANEOUS EVERY 8 HOURS SCHEDULED
Status: DISCONTINUED | OUTPATIENT
Start: 2017-01-15 | End: 2017-01-21 | Stop reason: HOSPADM

## 2017-01-15 RX ORDER — GLYCOPYRROLATE 0.2 MG/ML
INJECTION INTRAMUSCULAR; INTRAVENOUS AS NEEDED
Status: DISCONTINUED | OUTPATIENT
Start: 2017-01-15 | End: 2017-01-15 | Stop reason: SURG

## 2017-01-15 RX ADMIN — MORPHINE SULFATE 4 MG: 4 INJECTION, SOLUTION INTRAMUSCULAR; INTRAVENOUS at 00:07

## 2017-01-15 RX ADMIN — ONDANSETRON 4 MG: 2 INJECTION INTRAMUSCULAR; INTRAVENOUS at 09:09

## 2017-01-15 RX ADMIN — Medication 3 MG: at 09:06

## 2017-01-15 RX ADMIN — HYDROCODONE POLISTIREX AND CHLORPHENIRAMINE POLISTIREX 5 ML: 10; 8 SUSPENSION, EXTENDED RELEASE ORAL at 10:51

## 2017-01-15 RX ADMIN — HYDROCODONE BITARTRATE AND ACETAMINOPHEN 1 TABLET: 10; 325 TABLET ORAL at 02:09

## 2017-01-15 RX ADMIN — HYDROMORPHONE HYDROCHLORIDE 0.5 MG: 1 INJECTION, SOLUTION INTRAMUSCULAR; INTRAVENOUS; SUBCUTANEOUS at 10:07

## 2017-01-15 RX ADMIN — ROCURONIUM BROMIDE 40 MG: 10 INJECTION, SOLUTION INTRAVENOUS at 08:19

## 2017-01-15 RX ADMIN — HYDROCODONE BITARTRATE AND ACETAMINOPHEN 1 TABLET: 10; 325 TABLET ORAL at 12:19

## 2017-01-15 RX ADMIN — CEFUROXIME 1.5 G: 1.5 INJECTION, POWDER, FOR SOLUTION INTRAVENOUS at 08:25

## 2017-01-15 RX ADMIN — ONDANSETRON 4 MG: 2 INJECTION INTRAMUSCULAR; INTRAVENOUS at 14:20

## 2017-01-15 RX ADMIN — MIDAZOLAM HYDROCHLORIDE 2 MG: 1 INJECTION, SOLUTION INTRAMUSCULAR; INTRAVENOUS at 07:45

## 2017-01-15 RX ADMIN — HYDROCODONE BITARTRATE AND ACETAMINOPHEN 1 TABLET: 10; 325 TABLET ORAL at 06:15

## 2017-01-15 RX ADMIN — SODIUM CHLORIDE 30 ML/HR: 9 INJECTION, SOLUTION INTRAVENOUS at 10:59

## 2017-01-15 RX ADMIN — DEXTROSE AND SODIUM CHLORIDE 100 ML/HR: 5; 900 INJECTION, SOLUTION INTRAVENOUS at 02:52

## 2017-01-15 RX ADMIN — HEPARIN SODIUM 5000 UNITS: 5000 INJECTION, SOLUTION INTRAVENOUS; SUBCUTANEOUS at 13:57

## 2017-01-15 RX ADMIN — HYDROMORPHONE HYDROCHLORIDE 0.5 MG: 1 INJECTION, SOLUTION INTRAMUSCULAR; INTRAVENOUS; SUBCUTANEOUS at 10:20

## 2017-01-15 RX ADMIN — FAMOTIDINE 20 MG: 20 TABLET ORAL at 14:19

## 2017-01-15 RX ADMIN — DEXAMETHASONE SODIUM PHOSPHATE 8 MG: 4 INJECTION, SOLUTION INTRAMUSCULAR; INTRAVENOUS at 08:43

## 2017-01-15 RX ADMIN — Medication: at 11:36

## 2017-01-15 RX ADMIN — MORPHINE SULFATE 4 MG: 4 INJECTION, SOLUTION INTRAMUSCULAR; INTRAVENOUS at 04:46

## 2017-01-15 RX ADMIN — HEPARIN SODIUM 5000 UNITS: 5000 INJECTION, SOLUTION INTRAVENOUS; SUBCUTANEOUS at 21:12

## 2017-01-15 RX ADMIN — HYDROCODONE POLISTIREX AND CHLORPHENIRAMINE POLISTIREX 5 ML: 10; 8 SUSPENSION, EXTENDED RELEASE ORAL at 00:07

## 2017-01-15 RX ADMIN — INSULIN LISPRO 4 UNITS: 100 INJECTION, SOLUTION INTRAVENOUS; SUBCUTANEOUS at 21:13

## 2017-01-15 RX ADMIN — FENTANYL CITRATE 50 MCG: 50 INJECTION, SOLUTION INTRAMUSCULAR; INTRAVENOUS at 09:45

## 2017-01-15 RX ADMIN — FENTANYL CITRATE 100 MCG: 50 INJECTION, SOLUTION INTRAMUSCULAR; INTRAVENOUS at 08:19

## 2017-01-15 RX ADMIN — HYDROCODONE BITARTRATE AND ACETAMINOPHEN 1 TABLET: 7.5; 325 TABLET ORAL at 15:54

## 2017-01-15 RX ADMIN — ALBUMIN HUMAN: 0.05 INJECTION, SOLUTION INTRAVENOUS at 08:48

## 2017-01-15 RX ADMIN — MORPHINE SULFATE 4 MG: 2 INJECTION, SOLUTION INTRAMUSCULAR; INTRAVENOUS at 10:51

## 2017-01-15 RX ADMIN — LIDOCAINE HYDROCHLORIDE 50 MG: 10 INJECTION, SOLUTION INFILTRATION; PERINEURAL at 08:19

## 2017-01-15 RX ADMIN — ROBINUL 0.3 MG: 0.2 INJECTION INTRAMUSCULAR; INTRAVENOUS at 09:07

## 2017-01-15 RX ADMIN — SODIUM CHLORIDE, POTASSIUM CHLORIDE, SODIUM LACTATE AND CALCIUM CHLORIDE: 600; 310; 30; 20 INJECTION, SOLUTION INTRAVENOUS at 08:19

## 2017-01-15 RX ADMIN — HYDROCODONE BITARTRATE AND ACETAMINOPHEN 1 TABLET: 7.5; 325 TABLET ORAL at 21:12

## 2017-01-15 RX ADMIN — FENTANYL CITRATE 25 MCG: 50 INJECTION, SOLUTION INTRAMUSCULAR; INTRAVENOUS at 08:58

## 2017-01-15 RX ADMIN — SODIUM CHLORIDE, POTASSIUM CHLORIDE, SODIUM LACTATE AND CALCIUM CHLORIDE: 600; 310; 30; 20 INJECTION, SOLUTION INTRAVENOUS at 09:15

## 2017-01-15 RX ADMIN — PROPOFOL 200 MG: 10 INJECTION, EMULSION INTRAVENOUS at 08:19

## 2017-01-15 RX ADMIN — Medication: at 15:29

## 2017-01-15 RX ADMIN — FENTANYL CITRATE 50 MCG: 50 INJECTION, SOLUTION INTRAMUSCULAR; INTRAVENOUS at 09:50

## 2017-01-15 RX ADMIN — CEFUROXIME 1.5 G: 1.5 INJECTION, SOLUTION INTRAVENOUS at 15:54

## 2017-01-15 RX ADMIN — TAZOBACTAM SODIUM AND PIPERACILLIN SODIUM 4.5 G: 500; 4 INJECTION, SOLUTION INTRAVENOUS at 06:15

## 2017-01-15 NOTE — ANESTHESIA POSTPROCEDURE EVALUATION
Patient: Luis Bloom    Procedure Summary     Date Anesthesia Start Anesthesia Stop Room / Location    01/15/17 0809 0934  [facility] / [facility]       Procedure Diagnosis Surgeon Provider    BRONCHOSCOPY THORACOTOMY (Right Bronchus) No diagnosis on file. MD Manuel Michaels MD          Anesthesia Type: general, epidural  Last vitals  BP      Temp      Pulse     Resp      SpO2        Post Anesthesia Care and Evaluation    Patient location during evaluation: PACU  Patient participation: complete - patient participated  Level of consciousness: awake and alert  Pain management: adequate  Airway patency: patent  Anesthetic complications: No anesthetic complications    Cardiovascular status: hemodynamically stable and acceptable  Respiratory status: nonlabored ventilation, acceptable and nasal cannula  Hydration status: acceptable

## 2017-01-15 NOTE — PLAN OF CARE
Problem: Pain, Acute (Adult)  Goal: Acceptable Pain Control/Comfort Level  Outcome: Ongoing (interventions implemented as appropriate)    Problem: Lung Surgery (via Thoracotomy) (Adult)  Goal: Signs and Symptoms of Listed Potential Problems Will be Absent or Manageable (Lung Surgery)  Outcome: Ongoing (interventions implemented as appropriate)

## 2017-01-15 NOTE — PLAN OF CARE
Problem: Pressure Ulcer Risk (Vazquez Scale) (Adult,Obstetrics,Pediatric)  Goal: Identify Related Risk Factors and Signs and Symptoms  Outcome: Ongoing (interventions implemented as appropriate)

## 2017-01-15 NOTE — PLAN OF CARE
Problem: Patient Care Overview (Adult)  Goal: Plan of Care Review  Outcome: Ongoing (interventions implemented as appropriate)    01/15/17 0542   Coping/Psychosocial Response Interventions   Plan Of Care Reviewed With patient;spouse;son   Patient Care Overview   Progress no change   Outcome Evaluation   Outcome Summary/Follow up Plan Plan for surgery 1/15 or 1/16; NPO since 0000; CHG x2; 2 L NC; Around the clock pain medication dosing;        Goal: Adult Individualization and Mutuality  Outcome: Ongoing (interventions implemented as appropriate)  Goal: Discharge Needs Assessment  Outcome: Ongoing (interventions implemented as appropriate)    01/14/17 0157 01/14/17 0203 01/14/17 0309   Discharge Needs Assessment   Discharge Disposition --  --  still a patient   Living Environment   Transportation Available --  family or friend will provide --    Self-Care   Equipment Currently Used at Home glucometer --  --          Problem: Pain, Acute (Adult)  Goal: Identify Related Risk Factors and Signs and Symptoms  Outcome: Outcome(s) achieved Date Met:  01/15/17    01/14/17 1744   Pain, Acute   Related Risk Factors (Acute Pain) positioning;disease process;fear   Signs and Symptoms (Acute Pain) fear of reinjury;verbalization of pain descriptors       Goal: Acceptable Pain Control/Comfort Level  Outcome: Ongoing (interventions implemented as appropriate)    01/15/17 0542   Pain, Acute (Adult)   Acceptable Pain Control/Comfort Level making progress toward outcome         Problem: Pressure Ulcer Risk (Vazquez Scale) (Adult,Obstetrics,Pediatric)  Goal: Identify Related Risk Factors and Signs and Symptoms  Outcome: Ongoing (interventions implemented as appropriate)    01/15/17 0542   Pressure Ulcer Risk (Vazquez Scale)   Related Risk Factors (Pressure Ulcer Risk (Vazquez Scale)) critical care admission;mobility impaired       Goal: Skin Integrity  Outcome: Ongoing (interventions implemented as appropriate)    01/15/17 0542   Pressure  Ulcer Risk (Vazquez Scale) (Adult,Obstetrics,Pediatric)   Skin Integrity making progress toward outcome

## 2017-01-15 NOTE — ANESTHESIA PROCEDURE NOTES
Airway  Urgency: elective    Date/Time: 1/15/2017 8:21 AM  End Time:1/15/2017 8:21 AM  Airway not difficult    General Information and Staff    Patient location during procedure: OR  Anesthesiologist: ZENAIDA FRANCOIS    Indications and Patient Condition  Indications for airway management: airway protection    Preoxygenated: yes  MILS maintained throughout  Mask difficulty assessment: 1 - vent by mask    Final Airway Details  Final airway type: endotracheal airway      Successful airway: ETT  Cuffed: yes   Successful intubation technique: direct laryngoscopy  Blade: Gisella  Blade size: #4  ETT size: 8.0 mm  Cormack-Lehane Classification: grade I - full view of glottis  Placement verified by: chest auscultation, bronchoscopy and capnometry   Measured from: gums  Number of attempts at approach: 1

## 2017-01-15 NOTE — OP NOTE
Operative Report    Preop Diagnosis: Right lung hernia herniation        Postop Diagnosis same        Procedure: #1 Fiberoptic bronchoscopy #2 Redo thoracotomy with closure of the ribs.        Surgeons: Sanjay De La Fuente        Assistant: Allan Brown and Eren Fox        Operative Findings: Right lung herniation        Description: She was brought to the operating room placed under general anesthesia. Fiberoptic bronchoscopy is carried out through the endotracheal tube. Scope was advanced down the right mainstem right lower and right middle lobe appeared to be normal. The takeoff of the right upper lobe were the thoracotomy been done but satisfactory. Scope strong and so left side the left upper left lower lobes appeared to be normal. Patient's placed in left lateral decubitus position with axillary roll in place. The right chest was prepped and draped in usual fashion. Incision was made in the old incision and proceeded the inner the chest cavity. The sutures and the fifth interspace appeared to have fractured and broken. These were removed without difficulty. The chest was thoroughly suctioned and irrigated out. The lung appeared to be satisfactory single 32 chest chest tube was placed inferiorly and sutured in place. The ribs were reapproximated with #5 Tycron sutures for these in a figure-of-eight fashion. The muscle layers were closed with 0 Vicryl a 2-0 Vicryl suture was a substantial tissues and 3-0 Monocryl completed the skin closure subcuticular.        EBL: 100 mL      Please note that portions of this note were completed with a voice recognition program. Efforts were made to edit the dictations, but words may be mistranscribed

## 2017-01-15 NOTE — PROGRESS NOTES
"Intensive Care Follow-up     Hospital:  LOS: 1 day   Mr. Luis Bloom, 61 y.o. male is followed for:   <principal problem not specified>        Subjective   Interval History:  Chart is reviewed. The patient has just returned from surgery where the herniation of his right lung was corrected and the defect was corrected. Patient states that he is feeling better and is only mildly sore. His breathing has not been a problem.    The patient's relevant past medical, surgical and social history were reviewed and updated in Epic as appropriate.        Objective     Infusions:    dextrose 5 % and sodium chloride 0.9 % 100 mL/hr Last Rate: 100 mL/hr (01/15/17 0252)   HYDROmorphone     niCARdipine 5-15 mg/hr    sodium chloride 30 mL/hr Last Rate: 30 mL/hr (01/15/17 1059)     Medications:    cefuroxime 1.5 g Intravenous Q8H   famotidine 20 mg Oral BID   heparin (porcine) 5,000 Units Subcutaneous Q8H   [MAR Hold] insulin lispro 0-9 Units Subcutaneous 4x Daily With Meals & Nightly   [MAR Hold] piperacillin-tazobactam 4.5 g Intravenous Q8H       Vital Sign Min/Max for last 24 hours  Temp  Min: 97.6 °F (36.4 °C)  Max: 99.9 °F (37.7 °C)   BP  Min: 99/77  Max: 157/93   Pulse  Min: 73  Max: 89   Resp  Min: 16  Max: 22   SpO2  Min: 91 %  Max: 97 %   Flow (L/min)  Min: 2  Max: 5       Input/Output for last 24 hour shift  01/14 0701 - 01/15 0700  In: 2801.4 [P.O.:480; I.V.:1921.4]  Out: 895 [Urine:895]      Objective:  General Appearance:  Well-appearing, in no acute distress and comfortable.    Vital signs: (most recent): Blood pressure 111/78, pulse 86, temperature 97.6 °F (36.4 °C), temperature source Axillary, resp. rate 22, height 68\" (172.7 cm), weight 238 lb (108 kg), SpO2 93 %.  No fever.    Output: Producing urine.    HEENT: Normal HEENT exam.    Lungs:  Normal respiratory rate and normal effort.  He is not in respiratory distress.  Breath sounds clear to auscultation.  No stridor.  No wheezes, rales, rhonchi or decreased " breath sounds.    Heart: Normal rate.  Regular rhythm.  S1 normal and S2 normal.  No murmur, gallop or friction rub.   Chest: (There is subcutaneous air in the right chest and back with extension into the neck. Rt CT in place. No air leak.)  Abdomen: Abdomen is soft and non-distended.  Bowel sounds are normal.   There is no abdominal tenderness.   There is no mass.   Extremities: Normal range of motion.    Neurological: Patient is alert and oriented to person, place and time.    Pupils:  Pupils are equal, round, and reactive to light.  Pupils are equal.   Skin:  Warm.  No rash, ecchymosis, cyanosis or ulceration.               Results from last 7 days  Lab Units 01/15/17  0322 01/14/17  0339   WBC 10*3/mm3 10.70 10.95*   HEMOGLOBIN g/dL 11.5* 12.2*   PLATELETS 10*3/mm3 229 259       Results from last 7 days  Lab Units 01/15/17  0322 01/14/17  0339   SODIUM mmol/L 143 143   POTASSIUM mmol/L 4.1 4.1   TOTAL CO2 mmol/L 28.0 32.0*   BUN mg/dL 10 14   CREATININE mg/dL 0.80 0.80   MAGNESIUM mg/dL  --  2.0   PHOSPHORUS mg/dL  --  3.5   GLUCOSE mg/dL 144* 130*     Estimated Creatinine Clearance: 112.6 mL/min (by C-G formula based on Cr of 0.8).            I reviewed the patient's results and images.     Assessment/Plan   Impression      Active Problems:    Pneumothorax       Plan        Continue ICU observation.  Pain control as required.  Mobilize as tolerated.  Labs a repeat imaging have been ordered for tomorrow.    Plan of care and goals reviewed with mulitdisciplinary team at daily rounds.   I discussed the patient's findings and my recommendations with patient, family and nursing staff         Issa Neville MD, [facility]  Pulmonary and Critical Care Medicine  01/15/17 12:08 PM

## 2017-01-15 NOTE — ANESTHESIA PREPROCEDURE EVALUATION
Anesthesia Evaluation     Patient summary reviewed and Nursing notes reviewed    No history of anesthetic complications   Airway   Mallampati: III  TM distance: >3 FB  Neck ROM: full  no difficulty expected  Dental    (+) edentulous, upper dentures and lower dentures    Pulmonary    (+) shortness of breath, decreased breath sounds,   Cardiovascular - normal exam  Exercise tolerance: poor (<4 METS)  (+) hypertension, CAD, cardiac stents Drug eluting stent more than 12 months ago PVD    ECG reviewed  Rhythm: regular  Rate: normal    Neuro/Psych  (+) psychiatric history Anxiety,    GI/Hepatic/Renal/Endo    (+) morbid obesity, hiatal hernia, GERD well controlled, chronic renal disease ARF, diabetes mellitus type 2 well controlled,   (-)  obesity    Musculoskeletal     Abdominal   (+) obese,     Abdomen: soft.   Substance History      OB/GYN          Other   (+) arthritis                        Anesthesia Plan    ASA 3     general     intravenous induction   Anesthetic plan and risks discussed with patient.

## 2017-01-16 ENCOUNTER — APPOINTMENT (OUTPATIENT)
Dept: GENERAL RADIOLOGY | Facility: HOSPITAL | Age: 62
End: 2017-01-16

## 2017-01-16 LAB
ANION GAP SERPL CALCULATED.3IONS-SCNC: 8 MMOL/L (ref 3–11)
BASOPHILS # BLD AUTO: 0.01 10*3/MM3 (ref 0–0.2)
BASOPHILS NFR BLD AUTO: 0.1 % (ref 0–1)
BUN BLD-MCNC: 14 MG/DL (ref 9–23)
BUN/CREAT SERPL: 23.3 (ref 7–25)
CALCIUM SPEC-SCNC: 9.2 MG/DL (ref 8.7–10.4)
CHLORIDE SERPL-SCNC: 102 MMOL/L (ref 99–109)
CO2 SERPL-SCNC: 32 MMOL/L (ref 20–31)
CREAT BLD-MCNC: 0.6 MG/DL (ref 0.6–1.3)
DEPRECATED RDW RBC AUTO: 52.1 FL (ref 37–54)
EOSINOPHIL # BLD AUTO: 0.06 10*3/MM3 (ref 0.1–0.3)
EOSINOPHIL NFR BLD AUTO: 0.4 % (ref 0–3)
ERYTHROCYTE [DISTWIDTH] IN BLOOD BY AUTOMATED COUNT: 15.4 % (ref 11.3–14.5)
GFR SERPL CREATININE-BSD FRML MDRD: 136 ML/MIN/1.73
GLUCOSE BLD-MCNC: 144 MG/DL (ref 70–100)
GLUCOSE BLDC GLUCOMTR-MCNC: 116 MG/DL (ref 70–130)
GLUCOSE BLDC GLUCOMTR-MCNC: 160 MG/DL (ref 70–130)
GLUCOSE BLDC GLUCOMTR-MCNC: 169 MG/DL (ref 70–130)
GLUCOSE BLDC GLUCOMTR-MCNC: 194 MG/DL (ref 70–130)
HCT VFR BLD AUTO: 31.6 % (ref 38.9–50.9)
HGB BLD-MCNC: 10.3 G/DL (ref 13.1–17.5)
IMM GRANULOCYTES # BLD: 0.09 10*3/MM3 (ref 0–0.03)
IMM GRANULOCYTES NFR BLD: 0.7 % (ref 0–0.6)
LYMPHOCYTES # BLD AUTO: 0.77 10*3/MM3 (ref 0.6–4.8)
LYMPHOCYTES NFR BLD AUTO: 5.6 % (ref 24–44)
MCH RBC QN AUTO: 30.3 PG (ref 27–31)
MCHC RBC AUTO-ENTMCNC: 32.6 G/DL (ref 32–36)
MCV RBC AUTO: 92.9 FL (ref 80–99)
MONOCYTES # BLD AUTO: 0.53 10*3/MM3 (ref 0–1)
MONOCYTES NFR BLD AUTO: 3.8 % (ref 0–12)
NEUTROPHILS # BLD AUTO: 12.34 10*3/MM3 (ref 1.5–8.3)
NEUTROPHILS NFR BLD AUTO: 89.4 % (ref 41–71)
PLATELET # BLD AUTO: 238 10*3/MM3 (ref 150–450)
PMV BLD AUTO: 10.2 FL (ref 6–12)
POTASSIUM BLD-SCNC: 4.5 MMOL/L (ref 3.5–5.5)
RBC # BLD AUTO: 3.4 10*6/MM3 (ref 4.2–5.76)
SODIUM BLD-SCNC: 142 MMOL/L (ref 132–146)
WBC NRBC COR # BLD: 13.8 10*3/MM3 (ref 3.5–10.8)

## 2017-01-16 PROCEDURE — 99233 SBSQ HOSP IP/OBS HIGH 50: CPT | Performed by: INTERNAL MEDICINE

## 2017-01-16 PROCEDURE — 82962 GLUCOSE BLOOD TEST: CPT

## 2017-01-16 PROCEDURE — 25010000002 HEPARIN (PORCINE) PER 1000 UNITS: Performed by: PHYSICIAN ASSISTANT

## 2017-01-16 PROCEDURE — 71010 HC CHEST PA OR AP: CPT

## 2017-01-16 PROCEDURE — 25010000002 CEFUROXIME PER 750 MG: Performed by: PHYSICIAN ASSISTANT

## 2017-01-16 PROCEDURE — 85025 COMPLETE CBC W/AUTO DIFF WBC: CPT | Performed by: PHYSICIAN ASSISTANT

## 2017-01-16 PROCEDURE — 80048 BASIC METABOLIC PNL TOTAL CA: CPT | Performed by: PHYSICIAN ASSISTANT

## 2017-01-16 RX ORDER — ATORVASTATIN CALCIUM 40 MG/1
40 TABLET, FILM COATED ORAL DAILY
Status: DISCONTINUED | OUTPATIENT
Start: 2017-01-16 | End: 2017-01-21 | Stop reason: HOSPADM

## 2017-01-16 RX ORDER — DOCUSATE SODIUM 100 MG/1
100 CAPSULE, LIQUID FILLED ORAL 2 TIMES DAILY PRN
Status: DISCONTINUED | OUTPATIENT
Start: 2017-01-16 | End: 2017-01-21 | Stop reason: HOSPADM

## 2017-01-16 RX ORDER — MELOXICAM 7.5 MG/1
15 TABLET ORAL DAILY
Status: DISCONTINUED | OUTPATIENT
Start: 2017-01-16 | End: 2017-01-21 | Stop reason: HOSPADM

## 2017-01-16 RX ORDER — ASPIRIN 81 MG/1
81 TABLET ORAL DAILY
Status: DISCONTINUED | OUTPATIENT
Start: 2017-01-16 | End: 2017-01-21 | Stop reason: HOSPADM

## 2017-01-16 RX ORDER — AMLODIPINE BESYLATE 5 MG/1
5 TABLET ORAL DAILY
Status: DISCONTINUED | OUTPATIENT
Start: 2017-01-16 | End: 2017-01-21 | Stop reason: HOSPADM

## 2017-01-16 RX ORDER — SENNA AND DOCUSATE SODIUM 50; 8.6 MG/1; MG/1
2 TABLET, FILM COATED ORAL 2 TIMES DAILY
Status: DISCONTINUED | OUTPATIENT
Start: 2017-01-16 | End: 2017-01-21 | Stop reason: HOSPADM

## 2017-01-16 RX ORDER — ATENOLOL 25 MG/1
25 TABLET ORAL DAILY
Status: DISCONTINUED | OUTPATIENT
Start: 2017-01-16 | End: 2017-01-21 | Stop reason: HOSPADM

## 2017-01-16 RX ADMIN — DOCUSATE SODIUM AND SENNOSIDES 2 TABLET: 8.6; 5 TABLET, FILM COATED ORAL at 18:02

## 2017-01-16 RX ADMIN — ASPIRIN 81 MG: 81 TABLET, COATED ORAL at 13:20

## 2017-01-16 RX ADMIN — HEPARIN SODIUM 5000 UNITS: 5000 INJECTION, SOLUTION INTRAVENOUS; SUBCUTANEOUS at 06:07

## 2017-01-16 RX ADMIN — HEPARIN SODIUM 5000 UNITS: 5000 INJECTION, SOLUTION INTRAVENOUS; SUBCUTANEOUS at 21:01

## 2017-01-16 RX ADMIN — HYDROCODONE POLISTIREX AND CHLORPHENIRAMINE POLISTIREX 5 ML: 10; 8 SUSPENSION, EXTENDED RELEASE ORAL at 21:38

## 2017-01-16 RX ADMIN — INSULIN LISPRO 2 UNITS: 100 INJECTION, SOLUTION INTRAVENOUS; SUBCUTANEOUS at 21:01

## 2017-01-16 RX ADMIN — FAMOTIDINE 20 MG: 20 TABLET ORAL at 08:12

## 2017-01-16 RX ADMIN — HYDROCODONE BITARTRATE AND ACETAMINOPHEN 1 TABLET: 7.5; 325 TABLET ORAL at 06:11

## 2017-01-16 RX ADMIN — DESMOPRESSIN ACETATE 40 MG: 0.2 TABLET ORAL at 13:20

## 2017-01-16 RX ADMIN — Medication: at 15:11

## 2017-01-16 RX ADMIN — SODIUM CHLORIDE 30 ML/HR: 9 INJECTION, SOLUTION INTRAVENOUS at 18:16

## 2017-01-16 RX ADMIN — AMLODIPINE BESYLATE 5 MG: 5 TABLET ORAL at 13:21

## 2017-01-16 RX ADMIN — FAMOTIDINE 20 MG: 20 TABLET ORAL at 17:58

## 2017-01-16 RX ADMIN — HYDROCODONE POLISTIREX AND CHLORPHENIRAMINE POLISTIREX 5 ML: 10; 8 SUSPENSION, EXTENDED RELEASE ORAL at 04:34

## 2017-01-16 RX ADMIN — ATENOLOL 25 MG: 25 TABLET ORAL at 13:20

## 2017-01-16 RX ADMIN — Medication: at 06:22

## 2017-01-16 RX ADMIN — HYDROCODONE BITARTRATE AND ACETAMINOPHEN 1 TABLET: 7.5; 325 TABLET ORAL at 14:44

## 2017-01-16 RX ADMIN — CEFUROXIME 1.5 G: 1.5 INJECTION, SOLUTION INTRAVENOUS at 00:03

## 2017-01-16 RX ADMIN — INSULIN LISPRO 2 UNITS: 100 INJECTION, SOLUTION INTRAVENOUS; SUBCUTANEOUS at 11:25

## 2017-01-16 RX ADMIN — MELOXICAM 15 MG: 7.5 TABLET ORAL at 11:25

## 2017-01-16 RX ADMIN — HEPARIN SODIUM 5000 UNITS: 5000 INJECTION, SOLUTION INTRAVENOUS; SUBCUTANEOUS at 13:20

## 2017-01-16 RX ADMIN — DOCUSATE SODIUM AND SENNOSIDES 2 TABLET: 8.6; 5 TABLET, FILM COATED ORAL at 11:25

## 2017-01-16 NOTE — PROGRESS NOTES
Discharge Planning Assessment  [facility]     Patient Name: Luis Bloom  MRN: [id number]  Today's Date: 1/16/2017    Admit Date: 1/14/2017          Discharge Needs Assessment       01/16/17 5147    Living Environment    Lives With spouse    Living Arrangements house    Provides Primary Care For no one    Quality Of Family Relationships supportive    Able to Return to Prior Living Arrangements yes    Discharge Needs Assessment    Concerns To Be Addressed denies needs/concerns at this time    Readmission Within The Last 30 Days previous discharge plan unsuccessful    Equipment Currently Used at Home glucometer;other (see comments)   nebulizer    Equipment Needed After Discharge none    Transportation Available family or friend will provide            Discharge Plan       01/16/17 8205    Case Management/Social Work Plan    Plan Home    Patient/Family In Agreement With Plan yes    Additional Comments Spoke with patient and wife at bedside.  Patient resides with his wife in [facility].   Prior to arrival patient was independent with ADL's and mobility.  Patient has a BP cuff, nebulizer and glucometer at home.  Patient  may need oxygen for home at discharge.  Patient would like to use [facility] in [facility] [phone] for any DME needs.  Patient has never used Home Health and has no preference.  Patient denies any discharge needs at this time.  CM will continue to follow.        Discharge Placement     No information found        Expected Discharge Date and Time     Expected Discharge Date Expected Discharge Time    Jan 18, 2017               Demographic Summary       01/16/17 5697    Referral Information    Admission Type inpatient    Arrived From another healthcare institution, not defined    Referral Source admission list    Reason For Consult discharge planning    Record Reviewed clinical discipline documentation;history and physical;medical record;patient profile    Contact  Information    Permission Granted to Share Information With ;family/designee    Primary Care Physician Information    Name Meir Betancur            Functional Status     None            Psychosocial     None            Abuse/Neglect     None            Legal     None            Substance Abuse     None            Patient Forms     None          Ade De Leon RN

## 2017-01-16 NOTE — PLAN OF CARE
Problem: Patient Care Overview (Adult)  Goal: Plan of Care Review  Outcome: Ongoing (interventions implemented as appropriate)    01/16/17 1811   Coping/Psychosocial Response Interventions   Plan Of Care Reviewed With patient;spouse   Patient Care Overview   Progress improving   Outcome Evaluation   Outcome Summary/Follow up Plan Up to chair today, Pt ambulated x2, 150ft, 250ft. Pt educated on pca pump and pain management. 30 out of chest tube. Pt had low po intake.         Problem: Lung Surgery (via Thoracotomy) (Adult)  Goal: Signs and Symptoms of Listed Potential Problems Will be Absent or Manageable (Lung Surgery)  Outcome: Ongoing (interventions implemented as appropriate)    Problem: Skin Integrity Impairment, Risk/Actual (Adult)  Goal: Identify Related Risk Factors and Signs and Symptoms  Outcome: Ongoing (interventions implemented as appropriate)  Goal: Skin Integrity/Wound Healing  Outcome: Ongoing (interventions implemented as appropriate)

## 2017-01-16 NOTE — PROGRESS NOTES
INTENSIVIST / PULMONARY FOLLOW UP NOTE     Hospital:  LOS: 2 days   Mr. Luis Bloom, 61 y.o. male is followed for:   Active Problems:    Pneumothorax          SUBJECTIVE   Still using dilaudid pca, no bm yet, no fever, no soa    The patient's relevant past medical, surgical, family, and social history were reviewed    Allergies and medications were reviewed    ROS:  Per subjective, all other systems were reviewed and were negative        OBJECTIVE     Vitals:    01/16/17 1100   BP: 108/75   Pulse: 78   Resp:    Temp:    SpO2: 94%     General Appearance:  Conversant, in no acute distress  Eyes:  No scleral icterus or pallor, PERRLA  Ears, Nose, Mouth, Throat:  Atraumatic, oropharynx clear  Neck:  Trachea midline, thyroid normal  Respiratory:  Clear to auscultation bilaterally, normal effort  Cardiovascular:  Regular rate and rhythm, no murmurs, no peripheral edema  Gastrointestinal:  Soft, non-tender, non-distended, no hepatosplenomegaly  Skin:  Normal temperature, no rash  Psychiatric:  Alert and oriented x 3, normal judgement and insight  Neuro:  No focal neurologic deficits observed    Relevant imaging studies and labs from 01/16/17 were reviewed and interpreted by me    Assessment/Plan   IMPRESSION / PLAN     Inpatient Problem List:  61 y.o.male:  Active Problems:    Pneumothorax       Impression:  61 y.o.male with relevant PMH of Stage 2A Sq Cell Lung CA s/p RUL Lobectomy 12/29, 90 py smoking quit 9 years ago, T2DM, CAD s/p stent, GERD, HTN admitted 1/14/2017 w/ dehiscence of prior surgical site and pneumothorax    Plan:  -post op care per surgery  -resume home mobic  -add scheduled stool softeners  -resume appropriate home meds  -replace lytes prn  -correction scale insulin  -heparin, scds/teds, h2  -ok for tele from my standpoint    Plan of care and goals reviewed with mulitdisciplinary team at daily rounds         Alexander Balderrama MD  Intensive Care Medicine  01/16/17 11:53 AM

## 2017-01-16 NOTE — PLAN OF CARE
Problem: Patient Care Overview (Adult)  Goal: Plan of Care Review  Outcome: Ongoing (interventions implemented as appropriate)    01/16/17 0632   Coping/Psychosocial Response Interventions   Plan Of Care Reviewed With patient   Patient Care Overview   Progress improving   Outcome Evaluation   Outcome Summary/Follow up Plan Patient had surgery per Sudhakar on 1/15, right chest tube placed. Some complaints of pain, PCA started per dayshift. 90 out of CT. Overall crepitus improving

## 2017-01-16 NOTE — PROGRESS NOTES
"   LOS: 2 days   Patient Care Team:  Meri Betancur MD as PCP - General (Family Medicine)    Subjective alert still very sore    Objective    Vital Sign Min/Max for last 24 hours  Temp  Min: 97.6 °F (36.4 °C)  Max: 99.9 °F (37.7 °C)   BP  Min: 97/70  Max: 157/93   Pulse  Min: 60  Max: 90   Resp  Min: 16  Max: 24   SpO2  Min: 85 %  Max: 97 %   Flow (L/min)  Min: 2  Max: 5   No Data Recorded     Flowsheet Rows         First Filed Value    Admission Height  68\" (172.7 cm) Documented at 01/14/2017 0053    Admission Weight  238 lb (108 kg) Documented at 01/14/2017 0053          Physical Exam:    Wound:    Pulses:     Mediastinal and Chest Tube Drainage: No air leak       Results Review:     Results from last 7 days  Lab Units 01/16/17  0332   WBC 10*3/mm3 13.80*   HEMOGLOBIN g/dL 10.3*   HEMATOCRIT % 31.6*   PLATELETS 10*3/mm3 238       Results from last 7 days  Lab Units 01/16/17  0332   SODIUM mmol/L 142   POTASSIUM mmol/L 4.5   CHLORIDE mmol/L 102   TOTAL CO2 mmol/L 32.0*   BUN mg/dL 14   CREATININE mg/dL 0.60   GLUCOSE mg/dL 144*   CALCIUM mg/dL 9.2             Assessment    Active Problems:    Pneumothorax      Patient stable        Sanjay De La Fuente MD  01/16/17  7:10 AM      Please note that portions of this note were completed with a voice recognition program. Efforts were made to edit the dictations, but words may be mistranscribed  "

## 2017-01-16 NOTE — PLAN OF CARE
Problem: Pain, Acute (Adult)  Goal: Acceptable Pain Control/Comfort Level  Outcome: Ongoing (interventions implemented as appropriate)    Problem: Pressure Ulcer Risk (Vazquez Scale) (Adult,Obstetrics,Pediatric)  Goal: Identify Related Risk Factors and Signs and Symptoms  Outcome: Outcome(s) achieved Date Met:  01/16/17  Goal: Skin Integrity  Outcome: Ongoing (interventions implemented as appropriate)    Problem: Lung Surgery (via Thoracotomy) (Adult)  Goal: Signs and Symptoms of Listed Potential Problems Will be Absent or Manageable (Lung Surgery)  Outcome: Ongoing (interventions implemented as appropriate)

## 2017-01-17 ENCOUNTER — APPOINTMENT (OUTPATIENT)
Dept: GENERAL RADIOLOGY | Facility: HOSPITAL | Age: 62
End: 2017-01-17

## 2017-01-17 LAB
ALBUMIN SERPL-MCNC: 3.3 G/DL (ref 3.2–4.8)
ALBUMIN/GLOB SERPL: 1.2 G/DL (ref 1.5–2.5)
ALP SERPL-CCNC: 66 U/L (ref 25–100)
ALT SERPL W P-5'-P-CCNC: 17 U/L (ref 7–40)
ANION GAP SERPL CALCULATED.3IONS-SCNC: 6 MMOL/L (ref 3–11)
AST SERPL-CCNC: 21 U/L (ref 0–33)
BASOPHILS # BLD AUTO: 0.01 10*3/MM3 (ref 0–0.2)
BASOPHILS NFR BLD AUTO: 0.1 % (ref 0–1)
BILIRUB SERPL-MCNC: 0.8 MG/DL (ref 0.3–1.2)
BUN BLD-MCNC: 15 MG/DL (ref 9–23)
BUN/CREAT SERPL: 25 (ref 7–25)
CALCIUM SPEC-SCNC: 9 MG/DL (ref 8.7–10.4)
CHLORIDE SERPL-SCNC: 103 MMOL/L (ref 99–109)
CO2 SERPL-SCNC: 32 MMOL/L (ref 20–31)
CREAT BLD-MCNC: 0.6 MG/DL (ref 0.6–1.3)
DEPRECATED RDW RBC AUTO: 53.4 FL (ref 37–54)
EOSINOPHIL # BLD AUTO: 1.08 10*3/MM3 (ref 0.1–0.3)
EOSINOPHIL NFR BLD AUTO: 11.3 % (ref 0–3)
ERYTHROCYTE [DISTWIDTH] IN BLOOD BY AUTOMATED COUNT: 15.7 % (ref 11.3–14.5)
GFR SERPL CREATININE-BSD FRML MDRD: 136 ML/MIN/1.73
GLOBULIN UR ELPH-MCNC: 2.8 GM/DL
GLUCOSE BLD-MCNC: 116 MG/DL (ref 70–100)
GLUCOSE BLDC GLUCOMTR-MCNC: 135 MG/DL (ref 70–130)
GLUCOSE BLDC GLUCOMTR-MCNC: 144 MG/DL (ref 70–130)
GLUCOSE BLDC GLUCOMTR-MCNC: 146 MG/DL (ref 70–130)
GLUCOSE BLDC GLUCOMTR-MCNC: 148 MG/DL (ref 70–130)
HCT VFR BLD AUTO: 30.6 % (ref 38.9–50.9)
HGB BLD-MCNC: 9.8 G/DL (ref 13.1–17.5)
IMM GRANULOCYTES # BLD: 0.07 10*3/MM3 (ref 0–0.03)
IMM GRANULOCYTES NFR BLD: 0.7 % (ref 0–0.6)
LYMPHOCYTES # BLD AUTO: 1.43 10*3/MM3 (ref 0.6–4.8)
LYMPHOCYTES NFR BLD AUTO: 15 % (ref 24–44)
MAGNESIUM SERPL-MCNC: 2.4 MG/DL (ref 1.3–2.7)
MCH RBC QN AUTO: 30.1 PG (ref 27–31)
MCHC RBC AUTO-ENTMCNC: 32 G/DL (ref 32–36)
MCV RBC AUTO: 93.9 FL (ref 80–99)
MONOCYTES # BLD AUTO: 0.52 10*3/MM3 (ref 0–1)
MONOCYTES NFR BLD AUTO: 5.4 % (ref 0–12)
NEUTROPHILS # BLD AUTO: 6.44 10*3/MM3 (ref 1.5–8.3)
NEUTROPHILS NFR BLD AUTO: 67.5 % (ref 41–71)
PHOSPHATE SERPL-MCNC: 2.9 MG/DL (ref 2.4–5.1)
PLATELET # BLD AUTO: 233 10*3/MM3 (ref 150–450)
PMV BLD AUTO: 10.1 FL (ref 6–12)
POTASSIUM BLD-SCNC: 3.9 MMOL/L (ref 3.5–5.5)
PROT SERPL-MCNC: 6.1 G/DL (ref 5.7–8.2)
RBC # BLD AUTO: 3.26 10*6/MM3 (ref 4.2–5.76)
SODIUM BLD-SCNC: 141 MMOL/L (ref 132–146)
WBC NRBC COR # BLD: 9.55 10*3/MM3 (ref 3.5–10.8)

## 2017-01-17 PROCEDURE — 84100 ASSAY OF PHOSPHORUS: CPT | Performed by: INTERNAL MEDICINE

## 2017-01-17 PROCEDURE — 71010 HC CHEST PA OR AP: CPT

## 2017-01-17 PROCEDURE — 82962 GLUCOSE BLOOD TEST: CPT

## 2017-01-17 PROCEDURE — 80053 COMPREHEN METABOLIC PANEL: CPT | Performed by: INTERNAL MEDICINE

## 2017-01-17 PROCEDURE — 99232 SBSQ HOSP IP/OBS MODERATE 35: CPT | Performed by: INTERNAL MEDICINE

## 2017-01-17 PROCEDURE — 25010000002 HEPARIN (PORCINE) PER 1000 UNITS: Performed by: PHYSICIAN ASSISTANT

## 2017-01-17 PROCEDURE — 25010000002 MORPHINE PER 10 MG: Performed by: THORACIC SURGERY (CARDIOTHORACIC VASCULAR SURGERY)

## 2017-01-17 PROCEDURE — 83735 ASSAY OF MAGNESIUM: CPT | Performed by: INTERNAL MEDICINE

## 2017-01-17 PROCEDURE — 85025 COMPLETE CBC W/AUTO DIFF WBC: CPT | Performed by: INTERNAL MEDICINE

## 2017-01-17 RX ORDER — POLYETHYLENE GLYCOL 3350 17 G/17G
17 POWDER, FOR SOLUTION ORAL 2 TIMES DAILY
Status: DISCONTINUED | OUTPATIENT
Start: 2017-01-17 | End: 2017-01-21 | Stop reason: HOSPADM

## 2017-01-17 RX ORDER — NALOXONE HCL 0.4 MG/ML
0.4 VIAL (ML) INJECTION
Status: DISCONTINUED | OUTPATIENT
Start: 2017-01-17 | End: 2017-01-21 | Stop reason: HOSPADM

## 2017-01-17 RX ORDER — DIAZEPAM 5 MG/1
5 TABLET ORAL EVERY 8 HOURS PRN
COMMUNITY
End: 2017-05-23

## 2017-01-17 RX ORDER — IPRATROPIUM BROMIDE AND ALBUTEROL SULFATE 2.5; .5 MG/3ML; MG/3ML
3 SOLUTION RESPIRATORY (INHALATION) EVERY 6 HOURS PRN
COMMUNITY
End: 2017-05-23

## 2017-01-17 RX ORDER — MORPHINE SULFATE 4 MG/ML
4 INJECTION, SOLUTION INTRAMUSCULAR; INTRAVENOUS EVERY 4 HOURS PRN
Status: DISCONTINUED | OUTPATIENT
Start: 2017-01-17 | End: 2017-01-21 | Stop reason: HOSPADM

## 2017-01-17 RX ADMIN — MORPHINE SULFATE 4 MG: 4 INJECTION, SOLUTION INTRAMUSCULAR; INTRAVENOUS at 20:22

## 2017-01-17 RX ADMIN — ATENOLOL 25 MG: 25 TABLET ORAL at 08:10

## 2017-01-17 RX ADMIN — AMLODIPINE BESYLATE 5 MG: 5 TABLET ORAL at 08:10

## 2017-01-17 RX ADMIN — HYDROCODONE BITARTRATE AND ACETAMINOPHEN 1 TABLET: 7.5; 325 TABLET ORAL at 18:11

## 2017-01-17 RX ADMIN — MELOXICAM 15 MG: 7.5 TABLET ORAL at 08:10

## 2017-01-17 RX ADMIN — POLYETHYLENE GLYCOL 3350 17 G: 17 POWDER, FOR SOLUTION ORAL at 10:35

## 2017-01-17 RX ADMIN — HYDROCODONE BITARTRATE AND ACETAMINOPHEN 1 TABLET: 7.5; 325 TABLET ORAL at 10:35

## 2017-01-17 RX ADMIN — MORPHINE SULFATE 4 MG: 4 INJECTION, SOLUTION INTRAMUSCULAR; INTRAVENOUS at 16:11

## 2017-01-17 RX ADMIN — Medication: at 09:21

## 2017-01-17 RX ADMIN — HEPARIN SODIUM 5000 UNITS: 5000 INJECTION, SOLUTION INTRAVENOUS; SUBCUTANEOUS at 22:16

## 2017-01-17 RX ADMIN — MAGNESIUM HYDROXIDE 10 ML: 2400 SUSPENSION ORAL at 18:11

## 2017-01-17 RX ADMIN — Medication: at 02:33

## 2017-01-17 RX ADMIN — HEPARIN SODIUM 5000 UNITS: 5000 INJECTION, SOLUTION INTRAVENOUS; SUBCUTANEOUS at 05:34

## 2017-01-17 RX ADMIN — HYDROCODONE BITARTRATE AND ACETAMINOPHEN 1 TABLET: 7.5; 325 TABLET ORAL at 22:16

## 2017-01-17 RX ADMIN — HYDROCODONE POLISTIREX AND CHLORPHENIRAMINE POLISTIREX 5 ML: 10; 8 SUSPENSION, EXTENDED RELEASE ORAL at 20:41

## 2017-01-17 RX ADMIN — DOCUSATE SODIUM AND SENNOSIDES 2 TABLET: 8.6; 5 TABLET, FILM COATED ORAL at 18:10

## 2017-01-17 RX ADMIN — POLYETHYLENE GLYCOL 3350 17 G: 17 POWDER, FOR SOLUTION ORAL at 18:10

## 2017-01-17 RX ADMIN — ASPIRIN 81 MG: 81 TABLET, COATED ORAL at 08:09

## 2017-01-17 RX ADMIN — FAMOTIDINE 20 MG: 20 TABLET ORAL at 08:10

## 2017-01-17 RX ADMIN — DOCUSATE SODIUM AND SENNOSIDES 2 TABLET: 8.6; 5 TABLET, FILM COATED ORAL at 08:10

## 2017-01-17 RX ADMIN — HYDROCODONE BITARTRATE AND ACETAMINOPHEN 1 TABLET: 7.5; 325 TABLET ORAL at 14:07

## 2017-01-17 RX ADMIN — HEPARIN SODIUM 5000 UNITS: 5000 INJECTION, SOLUTION INTRAVENOUS; SUBCUTANEOUS at 14:10

## 2017-01-17 RX ADMIN — DESMOPRESSIN ACETATE 40 MG: 0.2 TABLET ORAL at 08:09

## 2017-01-17 RX ADMIN — DOCUSATE SODIUM 100 MG: 100 CAPSULE, LIQUID FILLED ORAL at 08:09

## 2017-01-17 NOTE — PROGRESS NOTES
INTENSIVIST / PULMONARY FOLLOW UP NOTE     Hospital:  LOS: 3 days   Mr. Luis Bloom, 61 y.o. male is followed for:   Active Problems:    Pneumothorax          SUBJECTIVE   Doing ok today, ambulating in galarza, pain controlled, no fever    The patient's relevant past medical, surgical, family, and social history were reviewed    Allergies and medications were reviewed    ROS:  Per subjective, all other systems were reviewed and were negative        OBJECTIVE     Vitals:    01/17/17 1000   BP: 128/76   Pulse: 67   Resp:    Temp:    SpO2: (!) 88%     General Appearance:  Conversant, in no acute distress  Eyes:  No scleral icterus or pallor, PERRLA  Ears, Nose, Mouth, Throat:  Atraumatic, oropharynx clear  Neck:  Trachea midline, thyroid normal  Respiratory:  Clear to auscultation bilaterally, normal effort  Cardiovascular:  Regular rate and rhythm, no murmurs, no peripheral edema  Gastrointestinal:  Soft, non-tender, non-distended, no hepatosplenomegaly  Skin:  Normal temperature, no rash  Psychiatric:  Alert and oriented x 3, normal judgement and insight  Neuro:  No focal neurologic deficits observed    Relevant imaging studies and labs from 01/17/17 were reviewed and interpreted by me    Assessment/Plan   IMPRESSION / PLAN     Inpatient Problem List:  61 y.o.male:  Active Problems:    Pneumothorax       Impression:  61 y.o.male with relevant PMH of Stage 2A Sq Cell Lung CA s/p RUL Lobectomy 12/29, 90 py smoking quit 9 years ago, T2DM, CAD s/p stent, GERD, HTN admitted 1/14/2017 w/ dehiscence of prior surgical site and pneumothorax    Plan:  -post op care per surgery  -add miralax  -resume appropriate home meds  -replace lytes prn  -correction scale insulin  -heparin, scds/teds, d/c pepcid (not needed)  -ok for tele from my standpoint    Plan of care and goals reviewed with mulitdisciplinary team at daily rounds         Alexander Balderrama MD  Intensive Care Medicine  01/17/17 11:57 AM

## 2017-01-17 NOTE — PROGRESS NOTES
"   LOS: 3 days   Patient Care Team:  Meir Betancur MD as PCP - General (Family Medicine)    Subjective doing satisfactory no complaints    Objective    Vital Sign Min/Max for last 24 hours  Temp  Min: 97.6 °F (36.4 °C)  Max: 98.2 °F (36.8 °C)   BP  Min: 91/61  Max: 129/72   Pulse  Min: 60  Max: 84   Resp  Min: 16  Max: 24   SpO2  Min: 87 %  Max: 97 %   Flow (L/min)  Min: 1  Max: 3   No Data Recorded     Flowsheet Rows         First Filed Value    Admission Height  68\" (172.7 cm) Documented at 01/14/2017 0053    Admission Weight  238 lb (108 kg) Documented at 01/14/2017 0053          Physical Exam:    Wound:    Pulses:     Mediastinal and Chest Tube Drainage: No air leak       Results Review:     Results from last 7 days  Lab Units 01/17/17  0355   WBC 10*3/mm3 9.55   HEMOGLOBIN g/dL 9.8*   HEMATOCRIT % 30.6*   PLATELETS 10*3/mm3 233       Results from last 7 days  Lab Units 01/17/17  0355   SODIUM mmol/L 141   POTASSIUM mmol/L 3.9   CHLORIDE mmol/L 103   TOTAL CO2 mmol/L 32.0*   BUN mg/dL 15   CREATININE mg/dL 0.60   GLUCOSE mg/dL 116*   CALCIUM mg/dL 9.0             Assessment    Active Problems:    Pneumothorax      Transfer to telemetry        Sanjay De La Fuente MD  01/17/17  7:16 AM      Please note that portions of this note were completed with a voice recognition program. Efforts were made to edit the dictations, but words may be mistranscribed  "

## 2017-01-17 NOTE — PLAN OF CARE
Problem: Patient Care Overview (Adult)  Goal: Plan of Care Review  Outcome: Ongoing (interventions implemented as appropriate)    01/17/17 7560   Coping/Psychosocial Response Interventions   Plan Of Care Reviewed With patient;spouse   Patient Care Overview   Progress improving   Outcome Evaluation   Outcome Summary/Follow up Plan PCA dc'd. Educated on pain management. Pt ambulated 600ft. Pt still no BM, offered senokot, MOM, Miralax and prune juice. Chest tube out 90. Pt still has small air leak. Pt has orders to be transferred to tele.          Problem: Lung Surgery (via Thoracotomy) (Adult)  Goal: Signs and Symptoms of Listed Potential Problems Will be Absent or Manageable (Lung Surgery)  Outcome: Ongoing (interventions implemented as appropriate)

## 2017-01-17 NOTE — PLAN OF CARE
Problem: Patient Care Overview (Adult)  Goal: Plan of Care Review  Outcome: Ongoing (interventions implemented as appropriate)    01/17/17 0541   Coping/Psychosocial Response Interventions   Plan Of Care Reviewed With patient   Patient Care Overview   Progress improving   Outcome Evaluation   Outcome Summary/Follow up Plan Patient ambulated x1, 350 ft. Pain well controlled with PCA. Chest tube output=50.          Problem: Pain, Acute (Adult)  Goal: Acceptable Pain Control/Comfort Level  Outcome: Ongoing (interventions implemented as appropriate)    Problem: Lung Surgery (via Thoracotomy) (Adult)  Goal: Signs and Symptoms of Listed Potential Problems Will be Absent or Manageable (Lung Surgery)  Outcome: Ongoing (interventions implemented as appropriate)    Problem: Skin Integrity Impairment, Risk/Actual (Adult)  Goal: Identify Related Risk Factors and Signs and Symptoms  Outcome: Outcome(s) achieved Date Met:  01/17/17  Goal: Skin Integrity/Wound Healing  Outcome: Ongoing (interventions implemented as appropriate)

## 2017-01-17 NOTE — PLAN OF CARE
Problem: Pain, Acute (Adult)  Goal: Identify Related Risk Factors and Signs and Symptoms  Outcome: Ongoing (interventions implemented as appropriate)  Goal: Acceptable Pain Control/Comfort Level  Outcome: Ongoing (interventions implemented as appropriate)

## 2017-01-18 LAB
GLUCOSE BLDC GLUCOMTR-MCNC: 107 MG/DL (ref 70–130)
GLUCOSE BLDC GLUCOMTR-MCNC: 133 MG/DL (ref 70–130)
GLUCOSE BLDC GLUCOMTR-MCNC: 142 MG/DL (ref 70–130)
GLUCOSE BLDC GLUCOMTR-MCNC: 188 MG/DL (ref 70–130)

## 2017-01-18 PROCEDURE — 25010000002 HEPARIN (PORCINE) PER 1000 UNITS: Performed by: PHYSICIAN ASSISTANT

## 2017-01-18 PROCEDURE — 25010000002 MORPHINE PER 10 MG: Performed by: THORACIC SURGERY (CARDIOTHORACIC VASCULAR SURGERY)

## 2017-01-18 PROCEDURE — 94640 AIRWAY INHALATION TREATMENT: CPT

## 2017-01-18 PROCEDURE — 82962 GLUCOSE BLOOD TEST: CPT

## 2017-01-18 PROCEDURE — 99233 SBSQ HOSP IP/OBS HIGH 50: CPT | Performed by: INTERNAL MEDICINE

## 2017-01-18 RX ORDER — IPRATROPIUM BROMIDE AND ALBUTEROL SULFATE 2.5; .5 MG/3ML; MG/3ML
3 SOLUTION RESPIRATORY (INHALATION) EVERY 4 HOURS PRN
Status: DISCONTINUED | OUTPATIENT
Start: 2017-01-18 | End: 2017-01-21 | Stop reason: HOSPADM

## 2017-01-18 RX ORDER — IPRATROPIUM BROMIDE AND ALBUTEROL SULFATE 2.5; .5 MG/3ML; MG/3ML
3 SOLUTION RESPIRATORY (INHALATION)
Status: DISCONTINUED | OUTPATIENT
Start: 2017-01-18 | End: 2017-01-21 | Stop reason: HOSPADM

## 2017-01-18 RX ADMIN — ASPIRIN 81 MG: 81 TABLET, COATED ORAL at 08:21

## 2017-01-18 RX ADMIN — HYDROCODONE POLISTIREX AND CHLORPHENIRAMINE POLISTIREX 5 ML: 10; 8 SUSPENSION, EXTENDED RELEASE ORAL at 20:54

## 2017-01-18 RX ADMIN — DESMOPRESSIN ACETATE 40 MG: 0.2 TABLET ORAL at 08:21

## 2017-01-18 RX ADMIN — HYDROCODONE BITARTRATE AND ACETAMINOPHEN 1 TABLET: 7.5; 325 TABLET ORAL at 05:56

## 2017-01-18 RX ADMIN — ATENOLOL 25 MG: 25 TABLET ORAL at 08:21

## 2017-01-18 RX ADMIN — HYDROCODONE BITARTRATE AND ACETAMINOPHEN 1 TABLET: 7.5; 325 TABLET ORAL at 10:00

## 2017-01-18 RX ADMIN — MELOXICAM 15 MG: 7.5 TABLET ORAL at 08:21

## 2017-01-18 RX ADMIN — HEPARIN SODIUM 5000 UNITS: 5000 INJECTION, SOLUTION INTRAVENOUS; SUBCUTANEOUS at 14:17

## 2017-01-18 RX ADMIN — IPRATROPIUM BROMIDE AND ALBUTEROL SULFATE 3 ML: .5; 3 SOLUTION RESPIRATORY (INHALATION) at 19:30

## 2017-01-18 RX ADMIN — MORPHINE SULFATE 4 MG: 4 INJECTION, SOLUTION INTRAMUSCULAR; INTRAVENOUS at 00:23

## 2017-01-18 RX ADMIN — MORPHINE SULFATE 4 MG: 4 INJECTION, SOLUTION INTRAMUSCULAR; INTRAVENOUS at 08:21

## 2017-01-18 RX ADMIN — HYDROCODONE BITARTRATE AND ACETAMINOPHEN 1 TABLET: 7.5; 325 TABLET ORAL at 22:21

## 2017-01-18 RX ADMIN — HYDROCODONE BITARTRATE AND ACETAMINOPHEN 1 TABLET: 7.5; 325 TABLET ORAL at 17:49

## 2017-01-18 RX ADMIN — INSULIN LISPRO 2 UNITS: 100 INJECTION, SOLUTION INTRAVENOUS; SUBCUTANEOUS at 11:57

## 2017-01-18 RX ADMIN — HEPARIN SODIUM 5000 UNITS: 5000 INJECTION, SOLUTION INTRAVENOUS; SUBCUTANEOUS at 05:56

## 2017-01-18 RX ADMIN — HYDROCODONE BITARTRATE AND ACETAMINOPHEN 1 TABLET: 7.5; 325 TABLET ORAL at 14:20

## 2017-01-18 RX ADMIN — AMLODIPINE BESYLATE 5 MG: 5 TABLET ORAL at 08:21

## 2017-01-18 RX ADMIN — HEPARIN SODIUM 5000 UNITS: 5000 INJECTION, SOLUTION INTRAVENOUS; SUBCUTANEOUS at 22:21

## 2017-01-18 NOTE — PROGRESS NOTES
INTENSIVIST / PULMONARY FOLLOW UP NOTE     Hospital:  LOS: 4 days   Mr. Luis Bloom, 61 y.o. male is followed for:   Active Problems:    Pneumothorax          SUBJECTIVE   Doing ok today, ambulating in [place], pain controlled, no fever, ct on h2o seal    The patient's relevant past medical, surgical, family, and social history were reviewed    Allergies and medications were reviewed    ROS:  Per subjective, all other systems were reviewed and were negative        OBJECTIVE     Vitals:    01/18/17 1300   BP:    Pulse: 68   Resp: 18   Temp:    SpO2: 90%     General Appearance:  Conversant, in no acute distress  Eyes:  No scleral icterus or pallor, PERRLA  Ears, Nose, Mouth, Throat:  Atraumatic, oropharynx clear  Neck:  Trachea midline, thyroid normal  Respiratory:  Clear to auscultation bilaterally, normal effort  Cardiovascular:  Regular rate and rhythm, no murmurs, no peripheral edema  Gastrointestinal:  Soft, non-tender, non-distended, no hepatosplenomegaly  Skin:  Normal temperature, no rash, subq air  Psychiatric:  Alert and oriented x 3, normal judgement and insight  Neuro:  No focal neurologic deficits observed    Relevant imaging studies and labs from 01/18/17 were reviewed and interpreted by me    Assessment/Plan   IMPRESSION / PLAN     Inpatient Problem List:  61 y.o.male:  Active Problems:    Pneumothorax       Impression:  61 y.o.male with relevant PMH of Stage 2A Sq Cell Lung CA s/p RUL Lobectomy 12/29, 90 py smoking quit 9 years ago, T2DM, CAD s/p stent, GERD, HTN admitted 1/14/2017 w/ dehiscence of prior surgical site and pneumothorax    Plan:  -post op care per surgery  -has had prn nebs ordered, will schedule them qid for pulmonary toilet  -will put on tussionex, would keep at least a week post d/c to suppress coughing  -resume appropriate home meds  -replace lytes prn  -correction scale insulin  -heparin, scds/teds, d/c'd pepcid (not needed)  -ok for tele from my standpoint    Plan of care and goals  reviewed with mulitdisciplinary team at daily rounds         Alexander Balderrama MD  Intensive Care Medicine  01/18/17 2:18 PM

## 2017-01-18 NOTE — H&P
"Patient seen and H&P updated.       Mr. Luis Bloom, 61 y.o. male is followed for:  Moderate right pneumothorax with surgical dehiscence/ right chest wall defect     As an Intensivist, we provide an integrated approach to the ICU patient and family, medical management of comorbid conditions, lead interdisciplinary rounds and coordinate the care with all other services, including those from other specialists.     Regina Bloom is a 60 yo  male who presented to [facility] ED last night c/o coughing up blood and shortness of air starting PTA. He has a h/o squamous cell carcinoma s/p right upper lobectomy per Dr De La Fuente on 12/29/2016.      He was discharged on 01/05/2017 and that evening he had presented to [facility] ED for SQ air of the face, neck, and chest. He was watched over night and GA'ed the next day. He was doing well since that time until he coughed up small amount of blood \"a few times\" and knew something was wrong.     CT Chest revealed dehiscence of prior surgical site with a large right chest wall defect; moderate right pneumonthorax with subcutaneous air and mild pneumomediastinum. Dr De La Fuente was called by the ED physician and accepted the patient for transfer to [facility] ICU admission.     Past medical History:  Mr.Arlie ANTHONY Bloom  has a past medical history of Anxiety; Arthritis; Brain aneurysm; CAD (coronary artery disease); Dyslipidemia; GERD (gastroesophageal reflux disease); Hiatal hernia; Hughes (hard of hearing); Hypertension; Lung nodule; Nephrolithiasis; Obesity; Squamous cell lung cancer; and Wears dentures.     Past Surgical History:  He  has a past surgical history that includes Other surgical history; Appendectomy; Coronary stent placement; Cerebral aneurysm repair (Right); orthopedic surgery; Kidney stone surgery; bronchoscopy thoracotomy (Right, 12/29/2016); and Lung lobectomy (Right, 12/29/2016).     Home Medications:       Medication Sig   • " "amLODIPine (NORVASC) 5 MG tablet Take 5 mg by mouth Daily.   • aspirin 81 MG EC tablet Take 81 mg by mouth Daily.   • atenolol (TENORMIN) 25 MG tablet Take 25 mg by mouth Daily.   • atorvastatin (LIPITOR) 40 MG tablet Take 40 mg by mouth Daily.   • hydrochlorothiazide (HYDRODIURIL) 25 MG tablet Take 25 mg by mouth Daily.   • HYDROcodone-acetaminophen (NORCO)  MG per tablet Take  tablets by mouth Every 8 (Eight) Hours As Needed for moderate pain (4-6).   • Ibuprofen (ADVIL) 200 MG capsule Take 400 mg by mouth 3 (Three) Times a Day As Needed (Arthritis pain).   • meloxicam (MOBIC) 15 MG tablet Take 15 mg by mouth Daily.   • raNITIdine (ZANTAC) 300 MG tablet Take 300 mg by mouth 2 (Two) Times a Day.         Allergies:  He is allergic to fish-derived products.     Family History:  His family history includes Coronary artery disease in his father; Diabetes in his mother; Hyperlipidemia in his father; Hypertension in his father; Lung cancer in his mother.     Social History:  He  reports that he quit smoking about 9 years ago. His smoking use included Cigarettes. He has a 90.00 pack-year smoking history. His smokeless tobacco use includes Chew. He reports that he drinks alcohol. He reports that he does not use illicit drugs.     The patient's relevant pst medical, surgical and social history were reviewed and updated in Epic as appropriate.     Review of Systems  As described in the HPI. Otherwise rest of ROS (14 systems) were negative.     Objective     O      Visit Vitals   • /82   • Pulse 92   • Temp 98.5 °F (36.9 °C) (Oral)   • Resp 18   • Ht 68\" (172.7 cm)   • Wt 238 lb (108 kg)   • SpO2 96%   • BMI 36.19 kg/m2         Medications (drips):     dextrose 5 % and sodium chloride 0.9 %      Physical Examination     Telemetry:  Sinus Rhythm: normal sinus rhythm   Constitutional:  No acute distress.   HEENT: Normocephalic and atraumatic. PERRLA   Cardiovascular: Normal rate, regular and rhythm. Normal " heart sounds.  No murmurs, gallop or rub.  No peripheral edema.   Respiratory: No respiratory distress. Normal respiratory effort.  Normal breath sounds  Clear to ascultation and percussion.  Skin incision from Right thoracotomy is closed. However the soft tissues are moving in & out, with breathing. There is also a noise coming from that site, all suggesting presence of an air leak.  Also subcutaneous crepitus.    Abdominal:  Soft. Obese. Non-tender. No distension. No HSM.   Extremities: No digital cyanosis. No clubbing. Bilateral LE edema.   Neurological:   Alert and Oriented to person, place, and time.  GCS 15   Psychiatric:  Normal affect. Normal behavior.         Estimated Creatinine Clearance: 90 mL/min (by C-G formula based on Cr of 1).     I reviewed the patient's new laboratory and imaging results.  I independently reviewed the patient's new images.     Images:  CXR: 1/14/2017 2:36 AM Right PTX and SQ Emphysema. Probable herniation of lung between Right 5th and 6 th rib.     Assessment/Plan     A / P      61 y.o.male, admitted on 1/14/2017 with:     1. Right Pneumothorax, Subcutaneous emphysema and probable herniation of lung between right 5th and 6th rib.  2. S/P Right Upper Lobectomy 12/29/2017 for lung cancer (NSCLC: Squamous cell carcinoma; 5.5 X 5.5 X 5.3 cm necrotic tumor, pT 2b, IIA).  1. In addition, caseating and calcified granulomas were noticed in the pathology report.  3. HTN  4. Dyslipidemia  5. CAD  6. Former smoker, quit 2008  7. T2DM        Assessment / Plan:     1. Blood cultures  2. Empiric abs  3. Glucose control  4. Awaiting Dr De La Fuente to see definitive therapy regarding right pneumothorax and prob. lung herniation.     Plan of care and goals reviewed during interdisciplinary rounds.  I discussed the patient's findings and my recommendations with patient, family and nursing staff     I have personally seen, interviewed and examined the patient and verified all the key components of the  history, physical examination, assessment and plan with JUSTIN Aclazar and reviewed the note, which reflects my changes and contributions.        Lew Yepez MD, FACP, FCCP, [facility]  Intensive Care Medicine  January 14, 2017 5:34 AM

## 2017-01-18 NOTE — PROGRESS NOTES
"   LOS: 4 days   Patient Care Team:  Meir Betancur MD as PCP - General (Family Medicine)    Subjective no complaints Objective    Vital Sign Min/Max for last 24 hours  Temp  Min: 97.8 °F (36.6 °C)  Max: 98.2 °F (36.8 °C)   BP  Min: 84/55  Max: 139/86   Pulse  Min: 60  Max: 90   Resp  Min: 15  Max: 22   SpO2  Min: 88 %  Max: 97 %   Flow (L/min)  Min: 2  Max: 2   No Data Recorded     Flowsheet Rows         First Filed Value    Admission Height  68\" (172.7 cm) Documented at 2017    Admission Weight  238 lb (108 kg) Documented at 2017          Physical Exam:    Wound: Satisfactory    Pulses:     Mediastinal and Chest Tube Drainage: No air leak       Results Review:     Results from last 7 days  Lab Units 17  0355   WBC 10*3/mm3 9.55   HEMOGLOBIN g/dL 9.8*   HEMATOCRIT % 30.6*   PLATELETS 10*3/mm3 233       Results from last 7 days  Lab Units 17  0355   SODIUM mmol/L 141   POTASSIUM mmol/L 3.9   CHLORIDE mmol/L 103   TOTAL CO2 mmol/L 32.0*   BUN mg/dL 15   CREATININE mg/dL 0.60   GLUCOSE mg/dL 116*   CALCIUM mg/dL 9.0             Assessment    Active Problems:    Pneumothorax      Need to add nebs. D  on chest tube. 5 AM portable chest x-ray ambulate in [facility]        Sanjay De La Fuente MD  17  6:45 AM      Please note that portions of this note were completed with a voice recognition program. Efforts were made to edit the dictations, but words may be mistranscribed  "

## 2017-01-18 NOTE — PLAN OF CARE
Problem: Patient Care Overview (Adult)  Goal: Plan of Care Review  Outcome: Ongoing (interventions implemented as appropriate)    01/18/17 0616   Coping/Psychosocial Response Interventions   Plan Of Care Reviewed With patient   Patient Care Overview   Progress improving   Outcome Evaluation   Outcome Summary/Follow up Plan Pain well managed throughout shift. Patient had 4 bowel movement. 80 out of chest tube. Orders to teley         Problem: Pain, Acute (Adult)  Goal: Identify Related Risk Factors and Signs and Symptoms  Outcome: Outcome(s) achieved Date Met:  01/18/17  Goal: Acceptable Pain Control/Comfort Level  Outcome: Ongoing (interventions implemented as appropriate)    Problem: Lung Surgery (via Thoracotomy) (Adult)  Goal: Signs and Symptoms of Listed Potential Problems Will be Absent or Manageable (Lung Surgery)  Outcome: Ongoing (interventions implemented as appropriate)    Problem: Skin Integrity Impairment, Risk/Actual (Adult)  Goal: Skin Integrity/Wound Healing  Outcome: Ongoing (interventions implemented as appropriate)

## 2017-01-19 ENCOUNTER — APPOINTMENT (OUTPATIENT)
Dept: GENERAL RADIOLOGY | Facility: HOSPITAL | Age: 62
End: 2017-01-19

## 2017-01-19 LAB
BACTERIA SPEC AEROBE CULT: NORMAL
BACTERIA SPEC AEROBE CULT: NORMAL
GLUCOSE BLDC GLUCOMTR-MCNC: 145 MG/DL (ref 70–130)
GLUCOSE BLDC GLUCOMTR-MCNC: 145 MG/DL (ref 70–130)
GLUCOSE BLDC GLUCOMTR-MCNC: 176 MG/DL (ref 70–130)
GLUCOSE BLDC GLUCOMTR-MCNC: 199 MG/DL (ref 70–130)

## 2017-01-19 PROCEDURE — 94640 AIRWAY INHALATION TREATMENT: CPT

## 2017-01-19 PROCEDURE — 71010 HC CHEST PA OR AP: CPT

## 2017-01-19 PROCEDURE — 25010000002 HEPARIN (PORCINE) PER 1000 UNITS: Performed by: PHYSICIAN ASSISTANT

## 2017-01-19 PROCEDURE — 99024 POSTOP FOLLOW-UP VISIT: CPT | Performed by: THORACIC SURGERY (CARDIOTHORACIC VASCULAR SURGERY)

## 2017-01-19 PROCEDURE — 82962 GLUCOSE BLOOD TEST: CPT

## 2017-01-19 PROCEDURE — 94760 N-INVAS EAR/PLS OXIMETRY 1: CPT

## 2017-01-19 RX ADMIN — IPRATROPIUM BROMIDE AND ALBUTEROL SULFATE 3 ML: .5; 3 SOLUTION RESPIRATORY (INHALATION) at 07:33

## 2017-01-19 RX ADMIN — HYDROCODONE BITARTRATE AND ACETAMINOPHEN 1 TABLET: 7.5; 325 TABLET ORAL at 23:50

## 2017-01-19 RX ADMIN — HYDROCODONE BITARTRATE AND ACETAMINOPHEN 1 TABLET: 7.5; 325 TABLET ORAL at 20:29

## 2017-01-19 RX ADMIN — ASPIRIN 81 MG: 81 TABLET, COATED ORAL at 08:03

## 2017-01-19 RX ADMIN — HEPARIN SODIUM 5000 UNITS: 5000 INJECTION, SOLUTION INTRAVENOUS; SUBCUTANEOUS at 20:29

## 2017-01-19 RX ADMIN — IPRATROPIUM BROMIDE AND ALBUTEROL SULFATE 3 ML: .5; 3 SOLUTION RESPIRATORY (INHALATION) at 20:36

## 2017-01-19 RX ADMIN — HYDROCODONE BITARTRATE AND ACETAMINOPHEN 1 TABLET: 7.5; 325 TABLET ORAL at 03:44

## 2017-01-19 RX ADMIN — HEPARIN SODIUM 5000 UNITS: 5000 INJECTION, SOLUTION INTRAVENOUS; SUBCUTANEOUS at 06:55

## 2017-01-19 RX ADMIN — IPRATROPIUM BROMIDE AND ALBUTEROL SULFATE 3 ML: .5; 3 SOLUTION RESPIRATORY (INHALATION) at 16:42

## 2017-01-19 RX ADMIN — HYDROCODONE POLISTIREX AND CHLORPHENIRAMINE POLISTIREX 5 ML: 10; 8 SUSPENSION, EXTENDED RELEASE ORAL at 20:29

## 2017-01-19 RX ADMIN — HYDROCODONE BITARTRATE AND ACETAMINOPHEN 1 TABLET: 7.5; 325 TABLET ORAL at 11:34

## 2017-01-19 RX ADMIN — MELOXICAM 15 MG: 7.5 TABLET ORAL at 08:03

## 2017-01-19 RX ADMIN — HYDROCODONE BITARTRATE AND ACETAMINOPHEN 1 TABLET: 7.5; 325 TABLET ORAL at 16:08

## 2017-01-19 RX ADMIN — AMLODIPINE BESYLATE 5 MG: 5 TABLET ORAL at 08:04

## 2017-01-19 RX ADMIN — HYDROCODONE POLISTIREX AND CHLORPHENIRAMINE POLISTIREX 5 ML: 10; 8 SUSPENSION, EXTENDED RELEASE ORAL at 08:03

## 2017-01-19 RX ADMIN — DESMOPRESSIN ACETATE 40 MG: 0.2 TABLET ORAL at 08:03

## 2017-01-19 RX ADMIN — IPRATROPIUM BROMIDE AND ALBUTEROL SULFATE 3 ML: .5; 3 SOLUTION RESPIRATORY (INHALATION) at 12:04

## 2017-01-19 RX ADMIN — INSULIN LISPRO 2 UNITS: 100 INJECTION, SOLUTION INTRAVENOUS; SUBCUTANEOUS at 12:37

## 2017-01-19 RX ADMIN — ATENOLOL 25 MG: 25 TABLET ORAL at 08:04

## 2017-01-19 RX ADMIN — HEPARIN SODIUM 5000 UNITS: 5000 INJECTION, SOLUTION INTRAVENOUS; SUBCUTANEOUS at 12:40

## 2017-01-19 RX ADMIN — INSULIN LISPRO 2 UNITS: 100 INJECTION, SOLUTION INTRAVENOUS; SUBCUTANEOUS at 20:37

## 2017-01-19 RX ADMIN — HYDROCODONE BITARTRATE AND ACETAMINOPHEN 1 TABLET: 7.5; 325 TABLET ORAL at 06:55

## 2017-01-19 NOTE — PLAN OF CARE
Problem: Patient Care Overview (Adult)  Goal: Plan of Care Review  Outcome: Ongoing (interventions implemented as appropriate)    01/19/17 0735   Coping/Psychosocial Response Interventions   Plan Of Care Reviewed With patient;spouse   Patient Care Overview   Progress no change   Outcome Evaluation   Outcome Summary/Follow up Plan CT is still on water seal. Continues to have subcutaneous air across chest, bilat arms and neck. Pain remains 2-4 but did not require any morphine through the night. was ordered to tele.         Problem: Pain, Acute (Adult)  Goal: Acceptable Pain Control/Comfort Level  Outcome: Ongoing (interventions implemented as appropriate)    Problem: Lung Surgery (via Thoracotomy) (Adult)  Goal: Signs and Symptoms of Listed Potential Problems Will be Absent or Manageable (Lung Surgery)  Outcome: Ongoing (interventions implemented as appropriate)    Problem: Skin Integrity Impairment, Risk/Actual (Adult)  Goal: Skin Integrity/Wound Healing  Outcome: Ongoing (interventions implemented as appropriate)

## 2017-01-19 NOTE — PROGRESS NOTES
"   LOS: 5 days   Patient Care Team:  Meir Betancur MD as PCP - General (Family Medicine)    Subjective  Sitting up at bedside with family present. Currently without complaints.     Objective    Vital Sign Min/Max for last 24 hours  Temp  Min: 97.7 °F (36.5 °C)  Max: 97.7 °F (36.5 °C)   BP  Min: 118/72  Max: 131/79   Pulse  Min: 63  Max: 91   Resp  Min: 16  Max: 22   SpO2  Min: 86 %  Max: 97 %   Flow (L/min)  Min: 2  Max: 2   No Data Recorded     Flowsheet Rows         First Filed Value    Admission Height  68\" (172.7 cm) Documented at 01/14/2017 0053    Admission Weight  238 lb (108 kg) Documented at 01/14/2017 0053          Physical Exam: Chest is clear to auscultation.  Some subcutaneous emphysema is noted along the thoracotomy incision site.  No air leak seen in the Pleur-evac  Chest x-ray shows good lung expansion bilaterally  Wound: C/D/I    Pulses:     Mediastinal and Chest Tube Drainage: 70mL out last 24 hours, small air leak present        Results Review:     Results from last 7 days  Lab Units 01/17/17  0355   WBC 10*3/mm3 9.55   HEMOGLOBIN g/dL 9.8*   HEMATOCRIT % 30.6*   PLATELETS 10*3/mm3 233       Results from last 7 days  Lab Units 01/17/17  0355   SODIUM mmol/L 141   POTASSIUM mmol/L 3.9   CHLORIDE mmol/L 103   TOTAL CO2 mmol/L 32.0*   BUN mg/dL 15   CREATININE mg/dL 0.60   GLUCOSE mg/dL 116*   CALCIUM mg/dL 9.0             Assessment: status post reoperation for  dehisced right thoracotomy rib closure, day 5     Active Problems:    Pneumothorax      Ambulate in hallway   Plan to remove the other chest tube in a.m.      CEM Wood  01/19/17  7:50 AM      Please note that portions of this note were completed with a voice recognition program. Efforts were made to edit the dictations, but words may be mistranscribed  "

## 2017-01-20 ENCOUNTER — APPOINTMENT (OUTPATIENT)
Dept: GENERAL RADIOLOGY | Facility: HOSPITAL | Age: 62
End: 2017-01-20

## 2017-01-20 LAB
GLUCOSE BLDC GLUCOMTR-MCNC: 115 MG/DL (ref 70–130)
GLUCOSE BLDC GLUCOMTR-MCNC: 126 MG/DL (ref 70–130)
GLUCOSE BLDC GLUCOMTR-MCNC: 136 MG/DL (ref 70–130)
GLUCOSE BLDC GLUCOMTR-MCNC: 178 MG/DL (ref 70–130)

## 2017-01-20 PROCEDURE — 94640 AIRWAY INHALATION TREATMENT: CPT

## 2017-01-20 PROCEDURE — 25010000002 HEPARIN (PORCINE) PER 1000 UNITS: Performed by: PHYSICIAN ASSISTANT

## 2017-01-20 PROCEDURE — 82962 GLUCOSE BLOOD TEST: CPT

## 2017-01-20 PROCEDURE — 94760 N-INVAS EAR/PLS OXIMETRY 1: CPT

## 2017-01-20 PROCEDURE — 25010000002 MORPHINE PER 10 MG: Performed by: THORACIC SURGERY (CARDIOTHORACIC VASCULAR SURGERY)

## 2017-01-20 PROCEDURE — 71010 HC CHEST PA OR AP: CPT

## 2017-01-20 RX ADMIN — HYDROCODONE POLISTIREX AND CHLORPHENIRAMINE POLISTIREX 5 ML: 10; 8 SUSPENSION, EXTENDED RELEASE ORAL at 09:00

## 2017-01-20 RX ADMIN — ASPIRIN 81 MG: 81 TABLET, COATED ORAL at 09:00

## 2017-01-20 RX ADMIN — AMLODIPINE BESYLATE 5 MG: 5 TABLET ORAL at 09:00

## 2017-01-20 RX ADMIN — HYDROCODONE BITARTRATE AND ACETAMINOPHEN 1 TABLET: 7.5; 325 TABLET ORAL at 13:09

## 2017-01-20 RX ADMIN — ATENOLOL 25 MG: 25 TABLET ORAL at 09:00

## 2017-01-20 RX ADMIN — HYDROCODONE BITARTRATE AND ACETAMINOPHEN 1 TABLET: 7.5; 325 TABLET ORAL at 17:16

## 2017-01-20 RX ADMIN — HYDROCODONE BITARTRATE AND ACETAMINOPHEN 1 TABLET: 7.5; 325 TABLET ORAL at 21:51

## 2017-01-20 RX ADMIN — HEPARIN SODIUM 5000 UNITS: 5000 INJECTION, SOLUTION INTRAVENOUS; SUBCUTANEOUS at 20:50

## 2017-01-20 RX ADMIN — IPRATROPIUM BROMIDE AND ALBUTEROL SULFATE 3 ML: .5; 3 SOLUTION RESPIRATORY (INHALATION) at 20:15

## 2017-01-20 RX ADMIN — HYDROCODONE POLISTIREX AND CHLORPHENIRAMINE POLISTIREX 5 ML: 10; 8 SUSPENSION, EXTENDED RELEASE ORAL at 20:50

## 2017-01-20 RX ADMIN — HEPARIN SODIUM 5000 UNITS: 5000 INJECTION, SOLUTION INTRAVENOUS; SUBCUTANEOUS at 13:05

## 2017-01-20 RX ADMIN — MELOXICAM 15 MG: 7.5 TABLET ORAL at 09:00

## 2017-01-20 RX ADMIN — IPRATROPIUM BROMIDE AND ALBUTEROL SULFATE 3 ML: .5; 3 SOLUTION RESPIRATORY (INHALATION) at 16:23

## 2017-01-20 RX ADMIN — IPRATROPIUM BROMIDE AND ALBUTEROL SULFATE 3 ML: .5; 3 SOLUTION RESPIRATORY (INHALATION) at 08:12

## 2017-01-20 RX ADMIN — DESMOPRESSIN ACETATE 40 MG: 0.2 TABLET ORAL at 08:59

## 2017-01-20 RX ADMIN — HYDROCODONE BITARTRATE AND ACETAMINOPHEN 1 TABLET: 7.5; 325 TABLET ORAL at 04:20

## 2017-01-20 RX ADMIN — INSULIN LISPRO 2 UNITS: 100 INJECTION, SOLUTION INTRAVENOUS; SUBCUTANEOUS at 17:09

## 2017-01-20 RX ADMIN — HEPARIN SODIUM 5000 UNITS: 5000 INJECTION, SOLUTION INTRAVENOUS; SUBCUTANEOUS at 05:19

## 2017-01-20 RX ADMIN — MORPHINE SULFATE 4 MG: 4 INJECTION, SOLUTION INTRAMUSCULAR; INTRAVENOUS at 04:20

## 2017-01-20 RX ADMIN — IPRATROPIUM BROMIDE AND ALBUTEROL SULFATE 3 ML: .5; 3 SOLUTION RESPIRATORY (INHALATION) at 12:09

## 2017-01-20 RX ADMIN — HYDROCODONE BITARTRATE AND ACETAMINOPHEN 1 TABLET: 7.5; 325 TABLET ORAL at 09:08

## 2017-01-20 NOTE — PROGRESS NOTES
"Adult Nutrition  Assessment/PES    Patient Name:  Luis Bloom  YOB: 1955  MRN: [id number]  Admit Date:  1/14/2017    Assessment Date:  1/20/2017        Reason for Assessment       01/20/17 1247    Reason for Assessment    Reason For Assessment/Visit length of stay    Time Spent (min) 20    Pulmonary/Critical Care --   pneumothorax                Anthropometrics       01/20/17 1248    Anthropometrics    Height 172.7 cm (68\")    Weight 109 kg (241 lb)    Ideal Body Weight (IBW)    Ideal Body Weight (IBW), Male (kg) 70.89    % Ideal Body Weight 154.52    Body Mass Index (BMI)    BMI (kg/m2) 36.72                  Nutrition Prescription Ordered       01/20/17 1248    Nutrition Prescription PO    Current PO Diet Regular    Supplement Boost Glucose Control    Supplement Frequency 3 times a day    Common Modifiers Cardiac;Consistent Carbohydrate;Lactose Restricted   allergic to fish-derived products            Evaluation of Received Nutrient/Fluid Intake       01/20/17 1249    PO Evaluation    Number of Meals 4    % PO Intake 69              Problem/Interventions:        Problem 1       01/20/17 1249    Nutrition Diagnoses Problem 1    Problem 1 Nutrition Appropriate for Condition at this Time    Etiology (related to) MNT for Treatment/Condition    Signs/Symptoms (evidenced by) PO Intake    Percent (%) intake recorded 69 %    Over number of meals 4                    Intervention Goal       01/20/17 1249    Intervention Goal    PO Maintain intake            Nutrition Intervention       01/20/17 1250    Nutrition Intervention    RD/Tech Action Supplement provided      01/20/17 1249    Nutrition Intervention    RD/Tech Action Follow Tx progress              Education/Evaluation       01/20/17 1249    Monitor/Evaluation    Monitor Per protocol        Comments:      Electronically signed by:  Shannan Bundy MS,RD,LD  01/20/17 12:50 PM  "

## 2017-01-20 NOTE — PLAN OF CARE
Problem: Patient Care Overview (Adult)  Goal: Plan of Care Review  Outcome: Ongoing (interventions implemented as appropriate)    01/20/17 0354   Coping/Psychosocial Response Interventions   Plan Of Care Reviewed With patient;spouse   Patient Care Overview   Progress progress toward functional goals is gradual   Outcome Evaluation   Outcome Summary/Follow up Plan ambulated around unit ad [place] tonight, tolerated well. co @ times, air leak of CT

## 2017-01-20 NOTE — PLAN OF CARE
Problem: Patient Care Overview (Adult)  Goal: Plan of Care Review  Outcome: Ongoing (interventions implemented as appropriate)    01/20/17 6045   Coping/Psychosocial Response Interventions   Plan Of Care Reviewed With patient;spouse   Patient Care Overview   Progress progress toward functional goals as expected

## 2017-01-20 NOTE — PROGRESS NOTES
Continued Stay Note  [facility]     Patient Name: Luis Bloom  MRN: [id number]  Today's Date: 1/20/2017    Admit Date: 1/14/2017          Discharge Plan       01/20/17 1446    Case Management/Social Work Plan    Plan home    Patient/Family In Agreement With Plan yes    Additional Comments Met with patient and wife at bedside. His plan remains to return home at discharge. He denies any needs at this time, states he does not need any DME or HH. His wife can transport home via car at discharge. CM continues to follow.               Discharge Codes     None        Expected Discharge Date and Time     Expected Discharge Date Expected Discharge Time    Jan 21, 2017             Dori Crystal RN

## 2017-01-20 NOTE — PROGRESS NOTES
"   LOS: 6 days   Patient Care Team:  Meir Betancur MD as PCP - General (Family Medicine)    Subjective alert and feeling much better        Objective    Vital Sign Min/Max for last 24 hours  Temp  Min: 97.7 °F (36.5 °C)  Max: 98.9 °F (37.2 °C)   BP  Min: 104/73  Max: 128/78   Pulse  Min: 68  Max: 85   Resp  Min: 16  Max: 20   SpO2  Min: 90 %  Max: 95 %   Flow (L/min)  Min: 2  Max: 2   No Data Recorded     Flowsheet Rows         First Filed Value    Admission Height  68\" (172.7 cm) Documented at 01/14/2017 0053    Admission Weight  238 lb (108 kg) Documented at 01/14/2017 0053          Physical Exam:    Wound: Satisfactory    Pulses:     Mediastinal and Chest Tube Drainage: No air leak       Results Review:     Results from last 7 days  Lab Units 01/17/17  0355   WBC 10*3/mm3 9.55   HEMOGLOBIN g/dL 9.8*   HEMATOCRIT % 30.6*   PLATELETS 10*3/mm3 233       Results from last 7 days  Lab Units 01/17/17  0355   SODIUM mmol/L 141   POTASSIUM mmol/L 3.9   CHLORIDE mmol/L 103   TOTAL CO2 mmol/L 32.0*   BUN mg/dL 15   CREATININE mg/dL 0.60   GLUCOSE mg/dL 116*   CALCIUM mg/dL 9.0             Assessment    Active Problems:    Pneumothorax      Chest x-ray looks satisfactory we will DC chest tube and hopefully discharge patient tomorrow        Sanjay De La Fuente MD  01/20/17  7:45 AM      Please note that portions of this note were completed with a voice recognition program. Efforts were made to edit the dictations, but words may be mistranscribed  "

## 2017-01-21 ENCOUNTER — APPOINTMENT (OUTPATIENT)
Dept: GENERAL RADIOLOGY | Facility: HOSPITAL | Age: 62
End: 2017-01-21

## 2017-01-21 VITALS
DIASTOLIC BLOOD PRESSURE: 81 MMHG | WEIGHT: 241 LBS | HEART RATE: 68 BPM | HEIGHT: 68 IN | TEMPERATURE: 98.5 F | BODY MASS INDEX: 36.53 KG/M2 | RESPIRATION RATE: 20 BRPM | OXYGEN SATURATION: 90 % | SYSTOLIC BLOOD PRESSURE: 127 MMHG

## 2017-01-21 LAB
GLUCOSE BLDC GLUCOMTR-MCNC: 121 MG/DL (ref 70–130)
GLUCOSE BLDC GLUCOMTR-MCNC: 139 MG/DL (ref 70–130)

## 2017-01-21 PROCEDURE — 82962 GLUCOSE BLOOD TEST: CPT

## 2017-01-21 PROCEDURE — 94640 AIRWAY INHALATION TREATMENT: CPT

## 2017-01-21 PROCEDURE — 71010 HC CHEST PA OR AP: CPT

## 2017-01-21 PROCEDURE — 25010000002 HEPARIN (PORCINE) PER 1000 UNITS: Performed by: PHYSICIAN ASSISTANT

## 2017-01-21 PROCEDURE — 94760 N-INVAS EAR/PLS OXIMETRY 1: CPT

## 2017-01-21 PROCEDURE — 99024 POSTOP FOLLOW-UP VISIT: CPT | Performed by: THORACIC SURGERY (CARDIOTHORACIC VASCULAR SURGERY)

## 2017-01-21 RX ADMIN — MELOXICAM 15 MG: 7.5 TABLET ORAL at 08:52

## 2017-01-21 RX ADMIN — HYDROCODONE BITARTRATE AND ACETAMINOPHEN 1 TABLET: 7.5; 325 TABLET ORAL at 13:14

## 2017-01-21 RX ADMIN — HYDROCODONE BITARTRATE AND ACETAMINOPHEN 1 TABLET: 7.5; 325 TABLET ORAL at 06:21

## 2017-01-21 RX ADMIN — IPRATROPIUM BROMIDE AND ALBUTEROL SULFATE 3 ML: .5; 3 SOLUTION RESPIRATORY (INHALATION) at 12:23

## 2017-01-21 RX ADMIN — HEPARIN SODIUM 5000 UNITS: 5000 INJECTION, SOLUTION INTRAVENOUS; SUBCUTANEOUS at 06:12

## 2017-01-21 RX ADMIN — HYDROCODONE POLISTIREX AND CHLORPHENIRAMINE POLISTIREX 5 ML: 10; 8 SUSPENSION, EXTENDED RELEASE ORAL at 08:53

## 2017-01-21 RX ADMIN — IPRATROPIUM BROMIDE AND ALBUTEROL SULFATE 3 ML: .5; 3 SOLUTION RESPIRATORY (INHALATION) at 07:54

## 2017-01-21 RX ADMIN — DESMOPRESSIN ACETATE 40 MG: 0.2 TABLET ORAL at 08:52

## 2017-01-21 RX ADMIN — ASPIRIN 81 MG: 81 TABLET, COATED ORAL at 08:52

## 2017-01-21 RX ADMIN — ATENOLOL 25 MG: 25 TABLET ORAL at 08:52

## 2017-01-21 RX ADMIN — AMLODIPINE BESYLATE 5 MG: 5 TABLET ORAL at 08:53

## 2017-01-21 NOTE — PROGRESS NOTES
CTS Progress Note      POD 6 s/p Fiberoptic bronchoscopy #2 Redo thoracotomy with closure of the ribs.  Status post recent right thoracotomy with right upper lobectomy on 12/29/2016 followed by a right lung herniation      Subjective  No complaints.  Concerned about drainage from chest tube sites as well as a abnormal sound when coughing that appears to be subcutaneous air escaping      Objective    Physical Exam:   Vital Signs   Temp:  [98.4 °F (36.9 °C)-98.6 °F (37 °C)] 98.5 °F (36.9 °C)  Heart Rate:  [72-93] 72  Resp:  [16-22] 18  BP: (113-134)/(72-93) 118/76   GEN: NAD   CV: Regular    RESP: Unlabored, decreased air movement right lung that hard to assess because of subcutaneous air   EXT: Warm   Incision: Clean dry and intact.  There is a bandage covering the chest tube holes     Results       Results from last 7 days  Lab Units 01/17/17  0355   WBC 10*3/mm3 9.55   HEMOGLOBIN g/dL 9.8*   HEMATOCRIT % 30.6*   PLATELETS 10*3/mm3 233       Results from last 7 days  Lab Units 01/17/17  0355   SODIUM mmol/L 141   POTASSIUM mmol/L 3.9   CHLORIDE mmol/L 103   TOTAL CO2 mmol/L 32.0*   BUN mg/dL 15   CREATININE mg/dL 0.60   GLUCOSE mg/dL 116*   CALCIUM mg/dL 9.0       Results from last 7 days  Lab Units 01/15/17  0322   INR  1.00       CXR: Persistent subcutaneous emphysema.  Lung on the right appears expanded      Assessment/Plan   Postoperative day 6 status post redo thoracotomy with closure of ribs secondary to a right lung herniation status post recent right thoracotomy with right upper lobectomy on 12/29/2016    Active Problems:    Pneumothorax   breathing unlabored and at today's chest x-ray with no evidence of pneumothorax.  Subcutaneous air has not increased.  However patient still leaking significant air from chest tube site.      Plan   Review chest x-ray with Dr. Rashid and discuss subcutaneous emphysema.  I do not think patient is ready to be discharged today.  Chest x-ray in the a.m.

## 2017-01-21 NOTE — PLAN OF CARE
Problem: Patient Care Overview (Adult)  Goal: Plan of Care Review  Outcome: Ongoing (interventions implemented as appropriate)    01/21/17 0537   Coping/Psychosocial Response Interventions   Plan Of Care Reviewed With patient;spouse   Patient Care Overview   Progress progress toward functional goals as expected         Problem: Pain, Acute (Adult)  Goal: Acceptable Pain Control/Comfort Level  Outcome: Ongoing (interventions implemented as appropriate)    01/21/17 0537   Pain, Acute (Adult)   Acceptable Pain Control/Comfort Level making progress toward outcome         Problem: Skin Integrity Impairment, Risk/Actual (Adult)  Goal: Skin Integrity/Wound Healing  Outcome: Ongoing (interventions implemented as appropriate)    01/21/17 0537   Skin Integrity Impairment, Risk/Actual (Adult)   Skin Integrity/Wound Healing making progress toward outcome         Problem: Chest Tube Drainage Device (Adult)  Goal: Signs and Symptoms of Listed Potential Problems Will be Absent or Manageable (Chest Tube Drainage Device)  Outcome: Outcome(s) achieved Date Met:  01/21/17

## 2017-01-21 NOTE — DISCHARGE INSTR - APPOINTMENTS
You need to make a follow up appointment with Dr De La Fuente in 2 weeks.  Sanjay De La Fuente MD  [address] [facility], [address]  [place] [id number] [phone]

## 2017-01-21 NOTE — PLAN OF CARE
Problem: Patient Care Overview (Adult)  Goal: Adult Individualization and Mutuality  Outcome: Outcome(s) achieved Date Met:  01/21/17  Goal: Discharge Needs Assessment  Outcome: Outcome(s) achieved Date Met:  01/21/17 01/21/17 1342   Discharge Needs Assessment   Concerns Comments Pt has the discharge med rec with the norco 10/325 which he used to take it at home and pt states that he has enough at home but in the chart, Dr De La Fuente prescribed norco 7.5/325 instead.. re-clarified the order with CEM Cabrera about the order and was told to conitinue the norco 10/325 which he has been taking at home..

## 2017-01-21 NOTE — PLAN OF CARE
Problem: Patient Care Overview (Adult)  Goal: Plan of Care Review  Outcome: Outcome(s) achieved Date Met:  01/21/17 01/21/17 4495   Coping/Psychosocial Response Interventions   Plan Of Care Reviewed With patient;spouse   Patient Care Overview   Progress progress toward functional goals as expected

## 2017-01-24 NOTE — DISCHARGE SUMMARY
CTS Discharge Summary    Patient Care Team:  Meir Betancur MD as PCP - General (Family Medicine)  Consults:   Consults     No orders found from 12/16/2016 to 1/15/2017.          Date of Admission: 1/14/2017 12:47 AM  Date of Discharge:  1/21/2017    Discharge Diagnosis  Right lung hernia herniation status post recent right thoracotomy    Past Medical History   Diagnosis Date   • Anxiety    • Arthritis    • Brain aneurysm    • CAD (coronary artery disease)    • Dyslipidemia    • GERD (gastroesophageal reflux disease)      Hiatal hernia    • Hiatal hernia    • [place] (hard of hearing)    • Hypertension    • Lung nodule    • Nephrolithiasis    • Obesity      BMI 36   • Squamous cell lung cancer    • Wears dentures      Pneumothorax [J93.9]     Procedure: #1 Fiberoptic bronchoscopy #2 Redo thoracotomy with closure of the ribs.  01/15/2017  Operative Findings: Right lung herniation    History of Present Illness  Patient is a 61 y.o. male who recently underwent a right thoracotomy with right upper lobectomy secondary to squamous cell carcinoma.  He was admitted on 12/29/2016 and discharged on 01/03/2017.  He presented at an outside ER with complaints of coughing up blood and shortness of air.  There he was found to have subcutaneous air in his face neck and chest.  He was monitored overnight and discharged the next day.  He had a CT scan of his chest which revealed a dehiscence of the prior surgical site and laid large right chest wall defect and a moderate right pneumothorax.  Dr. De La Fuente was contacted and accepted the patient in transfer back to [facility].      Hospital Course  01/14/2017 patient was admitted [facility].  Patient was evaluated by Dr. De La Fuente and found to have a herniation the right lung with surgical dehiscence.  It was decided to take the patient back to the OR 01/15/2017 where the patient underwent a redo thoracotomy with closure of ribs.  Again the patient tolerated  this procedure well without complication.  Transferred from the OR suite to the recovery room extubated in stable condition.    From covering the patient was transferred to the ICU.  In ICU the patient was followed by the hospital intensivist service per hospital protocol.  Patient was initially followed in ICU.  Patient was seen on daily rounds.  Follow with serial x-rays.  Once the x-ray was found to be stable off suction chest tubes were discontinued.  Patient did develop postoperative subcutaneous emphysema.  All chest tubes have been discontinued on 01/20/2017.  Follow-up x-ray on 01/21/2017 showed the persistent subcutaneous emphysema but no pneumothorax..jpbprogress  Stated the patient was somewhat concerned about drainage from chest tube insertion sites and a funny sound like air escaping while coughing.  This was believed to be subcutaneous air.  The patient was in no distress.  It was actually noted that Dr. Rashid who was on-call for the day felt the patient should stay another day for repeat x-ray however the family and patient were insistent on discharge home.  Patient was instructed that if shortness of breath or other concerning symptoms occur he should report to the ER immediately.  And on 01/21/2017 the patient was discharged home on the follow-up appointments were  made.    Discharge Medications   Luis Bloom   Home Medication Instructions MELLISA:[id number]    Printed on:01/24/17 1172   Medication Information                      amLODIPine (NORVASC) 5 MG tablet  Take 5 mg by mouth Daily.             aspirin 81 MG EC tablet  Take 81 mg by mouth Daily.             atenolol (TENORMIN) 25 MG tablet  Take 25 mg by mouth Daily.             atorvastatin (LIPITOR) 40 MG tablet  Take 40 mg by mouth Daily.             diazePAM (VALIUM) 5 MG tablet  Take 5 mg by mouth Every 8 (Eight) Hours As Needed for anxiety. Patient stated not really taking, has only taken 3 or 4 tablets total since prescribed              Exenatide ER (BYDUREON) 2 MG pen-injector  Inject 2 mg under the skin Every 7 (Seven) Days. Pt stated that has been receiving samples             hydrochlorothiazide (HYDRODIURIL) 25 MG tablet  Take 25 mg by mouth Daily.             HYDROcodone-acetaminophen (NORCO)  MG per tablet  Take  tablets by mouth Every 6 (Six) Hours As Needed for moderate pain (4-6).             ipratropium-albuterol (DUO-NEB) 0.5-2.5 mg/mL nebulizer  Take 3 mL by nebulization Every 6 (Six) Hours As Needed for wheezing.             meloxicam (MOBIC) 15 MG tablet  Take 15 mg by mouth Daily.             raNITIdine (ZANTAC) 300 MG tablet  Take 300 mg by mouth 2 (Two) Times a Day.                 Discharge Diet:  cardiac non-concentrated sweet    Activity at Discharge:  as tolerated    Follow-up Appointments  Future Appointments  Date Time Provider Department Center   2/20/2017 2:45 PM Sanjay De La Fuente MD [facility] None        WOUND CARE: Surgical 1 should remain clean dry and opened air call Dr. De La Fuente with questions or concerns   01/24/17  9:51 AM

## 2017-02-09 ENCOUNTER — TRANSCRIBE ORDERS (OUTPATIENT)
Dept: LAB | Facility: HOSPITAL | Age: 62
End: 2017-02-09

## 2017-05-15 ENCOUNTER — OFFICE VISIT (OUTPATIENT)
Dept: SURGERY | Facility: CLINIC | Age: 62
End: 2017-05-15

## 2017-05-15 VITALS
HEART RATE: 84 BPM | WEIGHT: 236 LBS | BODY MASS INDEX: 35.77 KG/M2 | RESPIRATION RATE: 16 BRPM | SYSTOLIC BLOOD PRESSURE: 166 MMHG | DIASTOLIC BLOOD PRESSURE: 98 MMHG | TEMPERATURE: 97.8 F | HEIGHT: 68 IN

## 2017-05-15 DIAGNOSIS — Z12.11 SCREENING FOR COLON CANCER: ICD-10-CM

## 2017-05-15 DIAGNOSIS — D64.9 ANEMIA, UNSPECIFIED TYPE: Primary | ICD-10-CM

## 2017-05-15 PROCEDURE — 99203 OFFICE O/P NEW LOW 30 MIN: CPT | Performed by: SURGERY

## 2017-05-15 RX ORDER — SODIUM CHLORIDE 9 MG/ML
30 INJECTION, SOLUTION INTRAVENOUS CONTINUOUS PRN
Status: CANCELLED | OUTPATIENT
Start: 2017-05-15

## 2017-05-30 ENCOUNTER — ANESTHESIA EVENT (OUTPATIENT)
Dept: GASTROENTEROLOGY | Facility: HOSPITAL | Age: 62
End: 2017-05-30

## 2017-05-30 ENCOUNTER — HOSPITAL ENCOUNTER (OUTPATIENT)
Facility: HOSPITAL | Age: 62
Setting detail: HOSPITAL OUTPATIENT SURGERY
Discharge: HOME | End: 2017-05-30
Attending: SURGERY | Admitting: SURGERY

## 2017-05-30 ENCOUNTER — ANESTHESIA (OUTPATIENT)
Dept: GASTROENTEROLOGY | Facility: HOSPITAL | Age: 62
End: 2017-05-30

## 2017-05-30 VITALS
BODY MASS INDEX: 34.86 KG/M2 | OXYGEN SATURATION: 95 % | SYSTOLIC BLOOD PRESSURE: 157 MMHG | RESPIRATION RATE: 16 BRPM | HEIGHT: 68 IN | DIASTOLIC BLOOD PRESSURE: 97 MMHG | HEART RATE: 71 BPM | TEMPERATURE: 97.4 F | WEIGHT: 230 LBS

## 2017-05-30 DIAGNOSIS — Z12.11 SCREENING FOR COLON CANCER: ICD-10-CM

## 2017-05-30 PROCEDURE — 25010000002 PROPOFOL 1000 MG/ML EMULSION

## 2017-05-30 RX ORDER — ONDANSETRON 2 MG/ML
4 INJECTION INTRAMUSCULAR; INTRAVENOUS ONCE AS NEEDED
Status: DISCONTINUED | OUTPATIENT
Start: 2017-05-30 | End: 2017-05-30 | Stop reason: HOSPADM

## 2017-05-30 RX ORDER — PROPOFOL 10 MG/ML
INJECTION, EMULSION INTRAVENOUS AS NEEDED
Status: DISCONTINUED | OUTPATIENT
Start: 2017-05-30 | End: 2017-05-30 | Stop reason: SURG

## 2017-05-30 RX ORDER — SODIUM CHLORIDE 9 MG/ML
30 INJECTION, SOLUTION INTRAVENOUS CONTINUOUS PRN
Status: DISCONTINUED | OUTPATIENT
Start: 2017-05-30 | End: 2017-05-30 | Stop reason: HOSPADM

## 2017-05-30 RX ADMIN — PROPOFOL 400 MG: 10 INJECTION, EMULSION INTRAVENOUS at 13:16

## 2017-05-30 RX ADMIN — SODIUM CHLORIDE 30 ML/HR: 9 INJECTION, SOLUTION INTRAVENOUS at 12:37

## 2017-05-30 RX ADMIN — LIDOCAINE HYDROCHLORIDE 100 MG: 20 INJECTION, SOLUTION INTRAVENOUS at 13:16

## 2018-10-01 PROBLEM — J93.9 PNEUMOTHORAX: Status: RESOLVED | Noted: 2017-01-14 | Resolved: 2018-10-01

## 2018-10-01 PROBLEM — E11.9 TYPE 2 DIABETES MELLITUS (HCC): Status: ACTIVE | Noted: 2018-10-01

## 2018-10-01 NOTE — PROGRESS NOTES
Subjective   Luis Bloom is a 62 y.o. male.  Meir Betancur MD [place], David Shirley, MD  [address] [place] [id number]      Chief Complaint   Patient presents with   • Coronary Artery Disease       Patient Active Problem List    Diagnosis   • CAD (coronary artery disease) I25.10     Priority: High     Overview Note:     1. Cardiac cath by Dr. Ye on July 14, 2008 showed 99% mid segment stenosis of the LAD with 90% stenosis of the first diagonal and total mid  segment occlusion of the nondominant right coronary artery. The circumflex had nonobstructive disease and LV function was normal with  ejection fraction of 65%.   2. Subsequent stenting of the LAD with 3.0 x 33mm Cypher stent proximally expanded to 4.0mm with balloon angioplasty of the diagonal, which remained patent.   3. MPA on 7/14/11 showing no reversible ischemia, normal LV systolic function.   4. MPS 8-14-18: 60 perfusion defect mid to basal inferior wall.  No reversible ischemia.  EF 59%       • Hypertension I10     Priority: Medium   • Dyslipidemia E78.5     Priority: Medium   • Type 2 diabetes mellitus (CMS/HCC) E11.9     Priority: Low   • Former smoker - quit in 2008. Z87.891     Priority: Low   • Obesity E66.9     Priority: Low   • Lung cancer, squamous cell carcinoma, status post right upper lobe lobectomy 12/29/2016 C34.90   • GERD (gastroesophageal reflux disease) K21.9     Overview Note:     Hiatal hernia     A. Upper GI series in August 12, 2008 showed hiatal hernia with severe esophageal reflux with distention all the way to the thoracic inlet without aspiration.         History of Present Illness   This is a 62 year old hypertensive, dyslipidemic, diabetic male with known coronary artery disease.  He fell out to regular cardiology follow-up since 2012.  He returns today to reestablish care.  He had a myocardial perfusion study this summer which was unremarkable.  He has no current complaint exertional chest  pain.  He has chronic exertional dyspnea since lung cancer surgery 3 years ago which has not progressed.  He denies orthopnea, PND, claudication, lower extremity edema.  He has no awareness of tachycardia arrhythmias, no dizziness or syncope.  Recent primary care labs show hemoglobin A1c not to goal at 7.9 and a lipid panel significant for triglycerides of 405.  He checks his blood pressure regularly at home but states his systolic blood pressure generally runs between 100 -110 with diastolic pressures 60-80.    Past Surgical History:   Procedure Laterality Date   • APPENDECTOMY     • BRONCHOSCOPY THORACOTOMY Right 12/29/2016    Procedure: Right thoracotomy, lobectomy, Bronchoscopy;  Surgeon: Sanjay De La Fuente MD;  Location:  [facility];  Service:    • BRONCHOSCOPY THORACOTOMY Right 1/15/2017    Procedure: BRONCHOSCOPY THORACOTOMY;  Surgeon: Sanjay De La Fuente MD;  Location:  [facility];  Service:    • CEREBRAL ANEURYSM REPAIR Right     center, back of brain, coiled   • COLONOSCOPY N/A 5/30/2017    Procedure: COLONOSCOPY;  Surgeon: Tremaine Lin MD;  Location:  [facility];  Service:    • CORONARY STENT PLACEMENT  2008    X1   • KIDNEY STONE SURGERY     • LUNG LOBECTOMY Right 12/29/2016    RUL   • ORTHOPEDIC SURGERY      bilateral elbows   • OTHER SURGICAL HISTORY      Right and left elbow surgery        The following portions of the patient's history were reviewed and updated as appropriate: allergies, current medications, past family history, past medical history, past social history, past surgical history and problem list.    Allergies   Allergen Reactions   • Fish-Derived Products          Current Outpatient Prescriptions:   •  amLODIPine (NORVASC) 5 MG tablet, Take 5 mg by mouth Daily., Disp: , Rfl:   •  aspirin 81 MG EC tablet, Take 81 mg by mouth Daily., Disp: , Rfl:   •  atenolol (TENORMIN) 25 MG tablet, Take 25 mg by mouth Daily., Disp: , Rfl:   •  atorvastatin (LIPITOR) 40 MG tablet, Take 40 mg by mouth  Daily., Disp: , Rfl:   •  hydrochlorothiazide (HYDRODIURIL) 25 MG tablet, Take 25 mg by mouth Daily., Disp: , Rfl:   •  HYDROcodone-acetaminophen (NORCO)  MG per tablet, Take  tablets by mouth Every 6 (Six) Hours As Needed for moderate pain (4-6)., Disp: , Rfl:   •  losartan (COZAAR) 50 MG tablet, Take 50 mg by mouth Daily., Disp: , Rfl:   •  meloxicam (MOBIC) 15 MG tablet, Take 15 mg by mouth Daily., Disp: , Rfl:   •  metFORMIN (GLUCOPHAGE) 500 MG tablet, Take 500 mg by mouth 2 (Two) Times a Day With Meals., Disp: , Rfl:   •  raNITIdine (ZANTAC) 300 MG tablet, Take 300 mg by mouth 2 (Two) Times a Day., Disp: , Rfl:     Review of Systems   Constitution: Negative.   HENT: Positive for hearing loss and tinnitus.    Eyes: Positive for blurred vision and visual disturbance.   Respiratory: Positive for shortness of breath and snoring.    Endocrine: Negative.    Hematologic/Lymphatic: Negative.    Skin: Negative.    Musculoskeletal: Positive for arthritis, muscle cramps and muscle weakness.   Gastrointestinal: Positive for heartburn.   Genitourinary: Negative.    Neurological: Positive for dizziness.   Psychiatric/Behavioral: Positive for memory loss.   Allergic/Immunologic: Negative.    All other systems reviewed and are negative.      Social History     Social History   • Marital status:      Spouse name: N/A   • Number of children: 3   • Years of education: N/A     Occupational History   • DISABLED/[employer]Y      Social History Main Topics   • Smoking status: Former Smoker     Packs/day: 2.00     Years: 45.00     Types: Cigarettes     Quit date: 2008   • Smokeless tobacco: Current User     Types: Chew      Comment: Quit in 2008   • Alcohol use No      Comment: no longer   • Drug use: No   • Sexual activity: Defer     Other Topics Concern   • Not on file     Social History Narrative   • No narrative on file       Family History   Problem Relation Age of Onset   • Lung cancer Mother    • Diabetes  "Mother    • Coronary artery disease Father    • Hyperlipidemia Father    • Hypertension Father        Objective      Blood pressure 114/64, pulse 64, height 172.7 cm (68\"), weight 105 kg (232 lb).    Physical Exam   Constitutional: He is oriented to person, place, and time. He appears well-developed and well-nourished.   HENT:   Head: Normocephalic and atraumatic.   Mouth/Throat: Oropharynx is clear and moist.   Eyes: Pupils are equal, round, and reactive to light. EOM are normal. No scleral icterus.   Neck: Neck supple. No JVD present. No thyromegaly present.   Cardiovascular: Normal rate and regular rhythm.  Exam reveals no gallop and no friction rub.    Murmur heard.  1/6 systolic ejection murmur right upper sternal border   Pulmonary/Chest: Breath sounds normal. No stridor. He has no wheezes. He has no rales.   Abdominal: Soft. Bowel sounds are normal. He exhibits no distension and no mass. There is no tenderness.   Musculoskeletal: Normal range of motion. He exhibits no edema, tenderness or deformity.   Lymphadenopathy:     He has no cervical adenopathy.   Neurological: He is alert and oriented to person, place, and time. No cranial nerve deficit. Coordination normal.   Skin: Skin is warm and dry. No rash noted. No erythema.   Psychiatric: He has a normal mood and affect.   Vitals reviewed.        ECG 12 Lead  Date/Time: 10/1/2018 3:17 PM  Performed by: ARAMIS ANN  Authorized by: ARAMIS ANN   Previous ECG: no previous ECG available  Rhythm: sinus rhythm  Rate: normal  Conduction: conduction normal  ST Segments: ST segments normal  T Waves: T waves normal  QRS axis: normal  Other findings: LVH  Clinical impression: abnormal ECG  Comments: Rate 64              Lab Review:   Lab Results   Component Value Date    GLUCOSE 116 (H) 01/17/2017    BUN 15 01/17/2017    CREATININE 0.60 01/17/2017    EGFRIFNONA 136 01/17/2017    BCR 25.0 01/17/2017    CO2 32.0 (H) 01/17/2017    CALCIUM 9.0 01/17/2017    ALBUMIN 3.30 " 01/17/2017    AST 21 01/17/2017    ALT 17 01/17/2017       Lab Results   Component Value Date    WBC 9.55 01/17/2017    HGB 9.8 (L) 01/17/2017    HCT 30.6 (L) 01/17/2017    MCV 93.9 01/17/2017     01/17/2017       Lab Results   Component Value Date    HGBA1C 7.00 (H) 01/14/2017       Lab Results   Component Value Date    TSH 1.408 01/14/2017       Labs from primary care dated July 10, 2018:  Hemoglobin A1c: 7.9  Lipid panel: Total cholesterol 195, triglycerides 405, HDL 37, LDL 77          Assessment:   Diagnosis Plan   1. Coronary artery disease involving native coronary artery of native heart without angina pectoris  Stable without current anginal symptoms    2. Dyslipidemia  Triglycerides not to goal with planned recheck lipid panel in one month.  Patient declined over-the-counter Fish oil due to perceived allergy to fish    3. Essential hypertension     4. Tobacco chew use     5. Tobacco abuse counseling  Counseled less than 5 minutes    6. Murmur, cardiac  Echocardiogram showing mild aortic sclerosis without stenosis       Plan:  Stable from CV standpoint.   Assessment and recommendations as above.  Continue current medications.   F/U in 6 months, sooner as needed.  Thank you for allowing us to participate in the care of your patient.     Scribed for Carley Ye MD by Mauricio Cardenas PA-C. 10/2/2018  9:45 AM    I, Carley Ye MD, personally performed the services described in this documentation as scribed by the above named individual in my presence, and it is both accurate and complete.  10/2/2018  10:11 AM

## 2018-10-02 ENCOUNTER — CONSULT (OUTPATIENT)
Dept: CARDIOLOGY | Facility: CLINIC | Age: 63
End: 2018-10-02

## 2018-10-02 VITALS
SYSTOLIC BLOOD PRESSURE: 114 MMHG | BODY MASS INDEX: 35.16 KG/M2 | HEIGHT: 68 IN | WEIGHT: 232 LBS | DIASTOLIC BLOOD PRESSURE: 64 MMHG | HEART RATE: 64 BPM

## 2018-10-02 DIAGNOSIS — Z71.6 TOBACCO ABUSE COUNSELING: ICD-10-CM

## 2018-10-02 DIAGNOSIS — R01.1 MURMUR, CARDIAC: ICD-10-CM

## 2018-10-02 DIAGNOSIS — Z72.0 TOBACCO CHEW USE: ICD-10-CM

## 2018-10-02 DIAGNOSIS — I25.10 CORONARY ARTERY DISEASE INVOLVING NATIVE CORONARY ARTERY OF NATIVE HEART WITHOUT ANGINA PECTORIS: Primary | ICD-10-CM

## 2018-10-02 DIAGNOSIS — I10 ESSENTIAL HYPERTENSION: ICD-10-CM

## 2018-10-02 DIAGNOSIS — E78.5 DYSLIPIDEMIA: ICD-10-CM

## 2018-10-02 PROCEDURE — 99214 OFFICE O/P EST MOD 30 MIN: CPT | Performed by: INTERNAL MEDICINE

## 2018-10-02 PROCEDURE — 93000 ELECTROCARDIOGRAM COMPLETE: CPT | Performed by: INTERNAL MEDICINE

## 2018-10-02 RX ORDER — LOSARTAN POTASSIUM 50 MG/1
50 TABLET ORAL DAILY
COMMUNITY

## 2018-11-16 ENCOUNTER — HOSPITAL ENCOUNTER (OUTPATIENT)
Dept: GENERAL RADIOLOGY | Facility: HOSPITAL | Age: 63
Discharge: HOME | End: 2018-11-16

## 2018-11-16 ENCOUNTER — HOSPITAL ENCOUNTER (OUTPATIENT)
Dept: GENERAL RADIOLOGY | Facility: HOSPITAL | Age: 63
Discharge: HOME | End: 2018-11-16
Attending: INTERNAL MEDICINE | Admitting: INTERNAL MEDICINE

## 2018-11-16 ENCOUNTER — TRANSCRIBE ORDERS (OUTPATIENT)
Dept: GENERAL RADIOLOGY | Facility: HOSPITAL | Age: 63
End: 2018-11-16

## 2018-11-16 DIAGNOSIS — E11.9 DIABETES MELLITUS WITHOUT COMPLICATION (HCC): ICD-10-CM

## 2018-11-16 DIAGNOSIS — N15.0: ICD-10-CM

## 2018-11-16 DIAGNOSIS — M13.0 MULTIPLE JOINT DISEASE: Primary | ICD-10-CM

## 2018-11-16 DIAGNOSIS — M75.21 BICIPITAL TENDINITIS OF RIGHT SHOULDER: ICD-10-CM

## 2018-11-16 DIAGNOSIS — M13.0 MULTIPLE JOINT DISEASE: ICD-10-CM

## 2018-11-16 PROCEDURE — 73120 X-RAY EXAM OF HAND: CPT

## 2018-11-16 PROCEDURE — 73562 X-RAY EXAM OF KNEE 3: CPT

## 2019-04-16 ENCOUNTER — OFFICE VISIT (OUTPATIENT)
Dept: CARDIOLOGY | Facility: CLINIC | Age: 64
End: 2019-04-16

## 2019-04-16 VITALS
BODY MASS INDEX: 34.56 KG/M2 | WEIGHT: 228 LBS | HEIGHT: 68 IN | DIASTOLIC BLOOD PRESSURE: 76 MMHG | SYSTOLIC BLOOD PRESSURE: 142 MMHG | HEART RATE: 64 BPM

## 2019-04-16 DIAGNOSIS — I10 ESSENTIAL HYPERTENSION: ICD-10-CM

## 2019-04-16 DIAGNOSIS — E78.5 DYSLIPIDEMIA: ICD-10-CM

## 2019-04-16 DIAGNOSIS — I25.10 CORONARY ARTERY DISEASE INVOLVING NATIVE CORONARY ARTERY OF NATIVE HEART WITHOUT ANGINA PECTORIS: Primary | ICD-10-CM

## 2019-04-16 DIAGNOSIS — I48.0 PAROXYSMAL ATRIAL FIBRILLATION (HCC): ICD-10-CM

## 2019-04-16 PROCEDURE — 99214 OFFICE O/P EST MOD 30 MIN: CPT | Performed by: INTERNAL MEDICINE

## 2019-04-16 RX ORDER — LEVOTHYROXINE SODIUM 0.03 MG/1
25 TABLET ORAL DAILY
COMMUNITY

## 2019-04-16 RX ORDER — PREDNISONE 10 MG/1
10 TABLET ORAL AS NEEDED
COMMUNITY
End: 2019-12-17 | Stop reason: SDDI

## 2019-04-16 RX ORDER — METOPROLOL SUCCINATE 50 MG/1
50 TABLET, EXTENDED RELEASE ORAL DAILY
Qty: 30 TABLET | Refills: 11 | Status: SHIPPED | OUTPATIENT
Start: 2019-04-16

## 2019-04-16 RX ORDER — METOPROLOL SUCCINATE 50 MG/1
50 TABLET, EXTENDED RELEASE ORAL DAILY
Qty: 30 TABLET | Refills: 11 | Status: SHIPPED | OUTPATIENT
Start: 2019-04-16 | End: 2019-04-16

## 2019-04-16 NOTE — PROGRESS NOTES
Encounter Date:04/16/2019      Patient ID: Luis Bloom is a 63 y.o. male.    Chief Complaint: Coronary Artery Disease      PROBLEM LIST:  1. Coronary artery disease:  a. [facility], 07/14/2008, Dr. Ye: 99% mid segment stenosis of the LAD with 90% stenosis of the first diagonal and total mid segment occlusion of the nondominant-RCA. The circumflex had nonobstructive disease and LV function was normal with EF 65%. Subsequent stenting of the LAD with 3.0 x 33mm Cypher stent proximally expanded to 4.0mm with balloon angioplasty of the diagonal, which remained patent.   b. [facility], 07/14/2011: No reversible ischemia, normal LV systolic function.   c. [facility], 08/14/2018: 60 perfusion defect mid to basal inferior wall. No reversible ischemia. EF 59%.  d. Echocardiogram, 08/14/2018: EF 55-60%. Mild anterior wall hypokinesis. Mildly calcified aortic valve leaflets, no significant stenosis. Poor quality study.  2. Hypertension.  3. Dyslipidemia.  4. DM2.  5. Former smoker, cessation in 2008.  6. Obesity.  7. Lung cancer, squamous cell carcinoma:  a. S/p right upper lobectomy, 12/29/2016.  8. GERD.  9. Hiatal hernia:  a. Upper GI series, 08/12/2008: Hiatal hernia with severe esophageal reflux with distention all the way to the thoracic inlet without aspiration     History of Present Illness  Patient presents today for 6 month follow-up with a history of CAD and cardiac risk factors.  He returns today for scheduled cardiology follow-up.  Since our last visit he presented to the emergency department at [facility] with a complaint of heart racing.  He is found to be in rapid atrial fibrillation with a ventricular rate of 135 bpm.  He converted to sinus rhythm and was discharged.  He has no current complaints of exertional chest pain or dyspnea orthopnea no PND no claudication or lower extremity.  He has had no reoccurrence of known tachyarrhythmias he denies dizziness or syncope.  His blood pressures at home run about  130-140 mmHg.  Cholesterol is followed by primary care    Allergies   Allergen Reactions   • Fish-Derived Products          Current Outpatient Medications:   •  aspirin 81 MG EC tablet, Take 81 mg by mouth Daily., Disp: , Rfl:   •  atenolol (TENORMIN) 25 MG tablet, Take 25 mg by mouth Daily., Disp: , Rfl:   •  atorvastatin (LIPITOR) 40 MG tablet, Take 40 mg by mouth Daily., Disp: , Rfl:   •  hydrochlorothiazide (HYDRODIURIL) 25 MG tablet, Take 25 mg by mouth Daily., Disp: , Rfl:   •  HYDROcodone-acetaminophen (NORCO)  MG per tablet, Take  tablets by mouth Every 6 (Six) Hours As Needed for moderate pain (4-6)., Disp: , Rfl:   •  levothyroxine (SYNTHROID, LEVOTHROID) 25 MCG tablet, Take 25 mcg by mouth Daily., Disp: , Rfl:   •  losartan (COZAAR) 50 MG tablet, Take 50 mg by mouth Daily., Disp: , Rfl:   •  meloxicam (MOBIC) 15 MG tablet, Take 15 mg by mouth Daily., Disp: , Rfl:   •  metFORMIN (GLUCOPHAGE) 500 MG tablet, Take 500 mg by mouth 2 (Two) Times a Day With Meals., Disp: , Rfl:   •  predniSONE (DELTASONE) 10 MG tablet, Take 10 mg by mouth As Needed., Disp: , Rfl:   •  raNITIdine (ZANTAC) 300 MG tablet, Take 300 mg by mouth 2 (Two) Times a Day., Disp: , Rfl:     The following portions of the patient's history were reviewed and updated as appropriate: allergies, current medications, past family history, past medical history, past social history, past surgical history and problem list.    ROS  Review of Systems   Constitution: Negative for chills, fatigue, fever, generalized weakness, weight gain and weight loss.   Cardiovascular: Negative for chest pain, claudication, dyspnea on exertion, leg swelling, orthopnea, palpitations, paroxysmal nocturnal dyspnea and syncope.   Respiratory: Negative for cough, shortness of breath, snoring, and wheezing.  HENT: Negative for ear pain, nosebleeds, and tinnitus.  Gastrointestinal: Negative for abdominal pain, constipation, diarrhea, nausea and vomiting.  "  Genitourinary: No urinary symptoms   Neurological: Negative for dizziness, headaches, loss of balance, numbness, and symptoms of stroke.  Psychiatric: Normal mental status.     All other systems reviewed and are negative.    Objective:     /76 (BP Location: Left arm, Patient Position: Sitting)   Pulse 64   Ht 172.7 cm (68\")   Wt 103 kg (228 lb)   BMI 34.67 kg/m²    Repeat blood pressure measurement       Physical Exam  Constitutional: Patient appears well-developed and well-nourished.   HENT: HEENT exam unremarkable.   Neck: Neck supple. No JVD present. No carotid bruits.   Cardiovascular: Normal rate, regular rhythm and normal heart sounds. No murmur heard.   2+ symmetric pulses.   Pulmonary/Chest: Breath sounds normal. Does not exhibit tenderness.   Abdominal: Abdomen benign.   Musculoskeletal: Does not exhibit edema.   Neurological: Neurological exam unremarkable.   Vitals reviewed.    Lab Review:   No recent lab results available for review.       CHADS-VASc Risk Assessment            4       Total Score        1 Hypertension    1 DM    1 Vascular Disease    1 Age 65-74          Procedures       Assessment:      Diagnosis Plan   1. Coronary artery disease involving native coronary artery of native heart without angina pectoris   stable without current anginal symptoms   2. Essential hypertension   discontinue atenolol, start Toprol XL 50 mg daily   3. Dyslipidemia   on statin therapy and followed by primary care   4. Paroxysmal atrial fibrillation (CMS/HCC) little symptomatic episode without subsequent  Holter Monitor - 48 Hour to assess episodes of asymptomatic arrhythmia, patient advised to go to the ER in case of recurrent bouts, will change to Toprol-XL 50 mg daily.  Not a good candidate for chronic anticoagulation due to residual cerebral aneurysms.  We will attempt to obtain records from [facility] to clarify.     Plan:   Recommendations as above  Stable cardiac status.  Continue current " medications.   FU in 6 MO, sooner as needed.  Thank you for allowing us to participate in the care of your patient.       Electronically signed by CEM Swann, 04/16/19, 12:00 PM.    ICarley MD, personally performed the services described in this documentation as scribed by the above named individual in my presence, and it is both accurate and complete.  4/16/2019  1:02 PM          Please note that portions of this note may have been completed with a voice recognition program. Efforts were made to edit the dictations, but occasionally words are mistranscribed.

## 2019-12-17 ENCOUNTER — OFFICE VISIT (OUTPATIENT)
Dept: CARDIOLOGY | Facility: CLINIC | Age: 64
End: 2019-12-17

## 2019-12-17 VITALS
HEIGHT: 68 IN | SYSTOLIC BLOOD PRESSURE: 122 MMHG | BODY MASS INDEX: 35.46 KG/M2 | HEART RATE: 57 BPM | DIASTOLIC BLOOD PRESSURE: 72 MMHG | OXYGEN SATURATION: 95 % | WEIGHT: 234 LBS

## 2019-12-17 DIAGNOSIS — I25.10 CORONARY ARTERY DISEASE INVOLVING NATIVE CORONARY ARTERY OF NATIVE HEART WITHOUT ANGINA PECTORIS: Primary | ICD-10-CM

## 2019-12-17 DIAGNOSIS — I10 ESSENTIAL HYPERTENSION: ICD-10-CM

## 2019-12-17 DIAGNOSIS — E78.5 DYSLIPIDEMIA: ICD-10-CM

## 2019-12-17 DIAGNOSIS — I49.1 PREMATURE ATRIAL CONTRACTIONS: ICD-10-CM

## 2019-12-17 PROCEDURE — 99214 OFFICE O/P EST MOD 30 MIN: CPT | Performed by: INTERNAL MEDICINE

## 2019-12-17 NOTE — PROGRESS NOTES
[facility] Cardiology    Encounter Date:2019        Patient ID: Luis Bloom is a 64 y.o. male.  : 1955     PCP: Meir Betancur MD     Chief Complaint: Coronary Artery Disease      PROBLEM LIST:  1. Coronary artery disease:  a. [facility], 2008, Dr. Ye: 99% mid segment stenosis of the LAD with 90% stenosis of the first diagonal and total mid segment occlusion of the nondominant-RCA. The circumflex had nonobstructive disease and LV function was normal with EF 65%. Subsequent stenting of the LAD with 3.0 x 33mm Cypher stent proximally expanded to 4.0mm with balloon angioplasty of the diagonal, which remained patent.   b. [facility], 2011: No reversible ischemia, normal LV systolic function.   c. [facility], 2018: 60 perfusion defect mid to basal inferior wall. No reversible ischemia. EF 59%.  d. Echocardiogram, 2018: EF 55-60%. Mild anterior wall hypokinesis. Mildly calcified aortic valve leaflets, no significant stenosis. Poor quality study.  2. Palpitations:  a. SR with rare PVCs and frequent PACs (4.3%). No significant arrhythmias.  3. Hypertension.  4. Dyslipidemia.  5. DM2.  6. Former smoker, cessation in .  7. Obesity.  8. Lung cancer, squamous cell carcinoma:  a. S/p right upper lobectomy, 2016.  9. GERD.  10. Hiatal hernia:  a. Upper GI series, 2008: Hiatal hernia with severe esophageal reflux with distention all the way to the thoracic inlet without aspiration   11. Brain aneurysm, followed at :  a. MRI with contrast, 2019: Stable 8 mm aneurysm along the proximal basilar artery.     History of Present Illness  Patient presents today for 6 month follow-up with a history of coronary artery disease, palpitations, and cardiac risk factors. Since last visit, he has been feeling well overall from a cardiovascular standpoint. He has been retired for years, and admits he does not have an exercise routine. He denies any chest pain,  shortness of breath, palpitations, leg swelling, dizziness, and syncope.    Allergies   Allergen Reactions   • Fish-Derived Products          Current Outpatient Medications:   •  aspirin 81 MG EC tablet, Take 81 mg by mouth Daily., Disp: , Rfl:   •  atorvastatin (LIPITOR) 40 MG tablet, Take 40 mg by mouth Daily., Disp: , Rfl:   •  hydrochlorothiazide (HYDRODIURIL) 25 MG tablet, Take 25 mg by mouth Daily., Disp: , Rfl:   •  HYDROcodone-acetaminophen (NORCO)  MG per tablet, Take  tablets by mouth Every 6 (Six) Hours As Needed for moderate pain (4-6)., Disp: , Rfl:   •  levothyroxine (SYNTHROID, LEVOTHROID) 25 MCG tablet, Take 25 mcg by mouth Daily., Disp: , Rfl:   •  losartan (COZAAR) 50 MG tablet, Take 50 mg by mouth Daily., Disp: , Rfl:   •  meloxicam (MOBIC) 15 MG tablet, Take 15 mg by mouth Daily., Disp: , Rfl:   •  metFORMIN (GLUCOPHAGE) 500 MG tablet, Take 500 mg by mouth 2 (Two) Times a Day With Meals., Disp: , Rfl:   •  metoprolol succinate XL (TOPROL-XL) 50 MG 24 hr tablet, Take 1 tablet by mouth Daily., Disp: 30 tablet, Rfl: 11  •  raNITIdine (ZANTAC) 300 MG tablet, Take 300 mg by mouth 2 (Two) Times a Day., Disp: , Rfl:     The following portions of the patient's history were reviewed and updated as appropriate: allergies, current medications, past family history, past medical history, past social history, past surgical history and problem list.    ROS  Review of Systems   Constitution: Negative for chills, fatigue, fever, generalized weakness, weight gain and weight loss.   Cardiovascular: Negative for chest pain, claudication, dyspnea on exertion, leg swelling, orthopnea, palpitations, paroxysmal nocturnal dyspnea and syncope.   Respiratory: Negative for cough, shortness of breath, and wheezing.  HENT: Negative for ear pain, nosebleeds, and tinnitus.  Gastrointestinal: Negative for abdominal pain, constipation, diarrhea, nausea and vomiting.   Genitourinary: No urinary symptoms  "  Musculoskeletal: Negative for muscle cramps.  Neurological: Negative for dizziness, headaches, loss of balance, numbness, and symptoms of stroke.  Psychiatric: Normal mental status.     All other systems reviewed and are negative.    Objective:     /72 (BP Location: Left arm, Patient Position: Sitting)   Pulse 57   Ht 172.7 cm (68\")   Wt 106 kg (234 lb)   SpO2 95%   BMI 35.58 kg/m²        Physical Exam  Constitutional: Patient appears well-developed and well-nourished.   HENT: HEENT exam unremarkable.   Neck: Neck supple. No JVD present. No carotid bruits.   Cardiovascular: Normal rate, regular rhythm and normal heart sounds. No murmur heard.   2+ symmetric pulses.   Pulmonary/Chest: Breath sounds normal. Does not exhibit tenderness.   Abdominal: Abdomen benign.   Musculoskeletal: Does not exhibit edema.   Neurological: Neurological exam unremarkable.   Vitals reviewed.    Lab Review:     Last lipid panel, 01/28/2019:      HDL 39  LDL 97     Procedures       Assessment:      Diagnosis Plan   1. Coronary artery disease involving native coronary artery of native heart without angina pectoris  Stable and asymptomatic, continue current medications   2. Essential hypertension  Well-controlled, continue current medications   3. Dyslipidemia  Managed by PCP,  continue atorvastatin 40 mg.   4. Premature atrial contractions  Stable and asymptomatic.     Plan:   Stable cardiac status.  Continue current medications.   FU in 12 MO, sooner as needed.  Thank you for allowing us to participate in the care of your patient.     Scribed for Carley eY MD by Varsha Angulo. 12/17/2019  1:44 PM      I, Carley Ye MD, personally performed the services described in this documentation as scribed by the above named individual in my presence, and it is both accurate and complete.  12/17/2019  1:57 PM        Please note that portions of this note may have been completed with a voice recognition program. Efforts " were made to edit the dictations, but occasionally words are mistranscribed.

## 2021-01-01 ENCOUNTER — APPOINTMENT (OUTPATIENT)
Dept: GENERAL RADIOLOGY | Facility: HOSPITAL | Age: 66
End: 2021-01-01

## 2021-01-01 ENCOUNTER — HOSPITAL ENCOUNTER (INPATIENT)
Facility: HOSPITAL | Age: 66
LOS: 1 days | End: 2021-02-14
Attending: EMERGENCY MEDICINE | Admitting: INTERNAL MEDICINE

## 2021-01-01 VITALS
RESPIRATION RATE: 24 BRPM | WEIGHT: 234.79 LBS | BODY MASS INDEX: 35.58 KG/M2 | OXYGEN SATURATION: 50 % | TEMPERATURE: 97 F | DIASTOLIC BLOOD PRESSURE: 83 MMHG | HEIGHT: 68 IN | SYSTOLIC BLOOD PRESSURE: 104 MMHG

## 2021-01-01 DIAGNOSIS — I46.9 CARDIAC ARREST (HCC): Primary | ICD-10-CM

## 2021-01-01 LAB
ALBUMIN SERPL-MCNC: 2.4 G/DL (ref 3.5–5.2)
ALBUMIN SERPL-MCNC: 2.4 G/DL (ref 3.5–5.2)
ALBUMIN/GLOB SERPL: 0.9 G/DL
ALP SERPL-CCNC: 157 U/L (ref 39–117)
ALP SERPL-CCNC: 158 U/L (ref 39–117)
ALT SERPL W P-5'-P-CCNC: 686 U/L (ref 1–41)
ALT SERPL W P-5'-P-CCNC: 696 U/L (ref 1–41)
ANION GAP SERPL CALCULATED.3IONS-SCNC: 27 MMOL/L (ref 5–15)
ANION GAP SERPL CALCULATED.3IONS-SCNC: 29 MMOL/L (ref 5–15)
APTT PPP: 38 SECONDS (ref 50–95)
ARTERIAL PATENCY WRIST A: ABNORMAL
AST SERPL-CCNC: 853 U/L (ref 1–40)
AST SERPL-CCNC: 854 U/L (ref 1–40)
ATMOSPHERIC PRESS: ABNORMAL MM[HG]
BASE EXCESS BLDA CALC-SCNC: -12.7 MMOL/L (ref 0–2)
BASE EXCESS BLDA CALC-SCNC: <-20 MMOL/L (ref 0–2)
BASE EXCESS BLDA CALC-SCNC: <-20 MMOL/L (ref 0–2)
BASOPHILS # BLD AUTO: 0.18 10*3/MM3 (ref 0–0.2)
BASOPHILS NFR BLD AUTO: 0.5 % (ref 0–1.5)
BDY SITE: ABNORMAL
BILIRUB CONJ SERPL-MCNC: 0.6 MG/DL (ref 0–0.3)
BILIRUB INDIRECT SERPL-MCNC: 0.3 MG/DL
BILIRUB SERPL-MCNC: 0.9 MG/DL (ref 0–1.2)
BILIRUB SERPL-MCNC: 0.9 MG/DL (ref 0–1.2)
BODY TEMPERATURE: 37 C
BUN SERPL-MCNC: 44 MG/DL (ref 8–23)
BUN SERPL-MCNC: 49 MG/DL (ref 8–23)
BUN/CREAT SERPL: 21 (ref 7–25)
BUN/CREAT SERPL: 23.2 (ref 7–25)
CALCIUM SPEC-SCNC: 8 MG/DL (ref 8.6–10.5)
CALCIUM SPEC-SCNC: 8 MG/DL (ref 8.6–10.5)
CHLORIDE SERPL-SCNC: 106 MMOL/L (ref 98–107)
CHLORIDE SERPL-SCNC: 110 MMOL/L (ref 98–107)
CO2 BLDA-SCNC: 11.5 MMOL/L (ref 22–33)
CO2 BLDA-SCNC: 12.2 MMOL/L (ref 22–33)
CO2 BLDA-SCNC: 18.5 MMOL/L (ref 22–33)
CO2 SERPL-SCNC: 12 MMOL/L (ref 22–29)
CO2 SERPL-SCNC: 14 MMOL/L (ref 22–29)
COHGB MFR BLD: 0.6 % (ref 0–2)
COHGB MFR BLD: 0.7 % (ref 0–2)
COHGB MFR BLD: 0.9 % (ref 0–2)
CREAT SERPL-MCNC: 2.1 MG/DL (ref 0.76–1.27)
CREAT SERPL-MCNC: 2.11 MG/DL (ref 0.76–1.27)
D DIMER PPP FEU-MCNC: >20 MCGFEU/ML (ref 0–0.56)
D-LACTATE SERPL-SCNC: 13.6 MMOL/L (ref 0.5–2)
D-LACTATE SERPL-SCNC: 14.9 MMOL/L (ref 0.5–2)
DEPRECATED RDW RBC AUTO: 63.1 FL (ref 37–54)
EOSINOPHIL # BLD AUTO: 0.01 10*3/MM3 (ref 0–0.4)
EOSINOPHIL NFR BLD AUTO: 0 % (ref 0.3–6.2)
EPAP: 0
ERYTHROCYTE [DISTWIDTH] IN BLOOD BY AUTOMATED COUNT: 16.3 % (ref 12.3–15.4)
FERRITIN SERPL-MCNC: ABNORMAL NG/ML (ref 30–400)
GFR SERPL CREATININE-BSD FRML MDRD: 31 ML/MIN/1.73
GFR SERPL CREATININE-BSD FRML MDRD: 32 ML/MIN/1.73
GLOBULIN UR ELPH-MCNC: 2.8 GM/DL
GLUCOSE BLDC GLUCOMTR-MCNC: 167 MG/DL (ref 70–130)
GLUCOSE BLDC GLUCOMTR-MCNC: 277 MG/DL (ref 70–130)
GLUCOSE BLDC GLUCOMTR-MCNC: 294 MG/DL (ref 70–130)
GLUCOSE BLDC GLUCOMTR-MCNC: 300 MG/DL (ref 70–130)
GLUCOSE SERPL-MCNC: 187 MG/DL (ref 65–99)
GLUCOSE SERPL-MCNC: 198 MG/DL (ref 65–99)
HCO3 BLDA-SCNC: 10.1 MMOL/L (ref 20–26)
HCO3 BLDA-SCNC: 10.3 MMOL/L (ref 20–26)
HCO3 BLDA-SCNC: 16.8 MMOL/L (ref 20–26)
HCT VFR BLD AUTO: 37.7 % (ref 37.5–51)
HCT VFR BLD CALC: 30 %
HCT VFR BLD CALC: 32.2 %
HCT VFR BLD CALC: 34.5 %
HGB BLD-MCNC: 10.9 G/DL (ref 13–17.7)
HGB BLDA-MCNC: 10.5 G/DL (ref 13.5–17.5)
HGB BLDA-MCNC: 11.3 G/DL (ref 13.5–17.5)
HGB BLDA-MCNC: 9.8 G/DL (ref 13.5–17.5)
HOLD SPECIMEN: NORMAL
IMM GRANULOCYTES # BLD AUTO: 1.74 10*3/MM3 (ref 0–0.05)
IMM GRANULOCYTES NFR BLD AUTO: 5.1 % (ref 0–0.5)
INHALED O2 CONCENTRATION: 100 %
INR PPP: 1.49 (ref 0.85–1.16)
IPAP: 0
L PNEUMO1 AG UR QL IA: NEGATIVE
LACTATE HOLD SPECIMEN: NORMAL
LDH SERPL-CCNC: 1954 U/L (ref 135–225)
LYMPHOCYTES # BLD AUTO: 6.7 10*3/MM3 (ref 0.7–3.1)
LYMPHOCYTES NFR BLD AUTO: 19.5 % (ref 19.6–45.3)
Lab: ABNORMAL
MAGNESIUM SERPL-MCNC: 2.7 MG/DL (ref 1.6–2.4)
MCH RBC QN AUTO: 30.4 PG (ref 26.6–33)
MCHC RBC AUTO-ENTMCNC: 28.9 G/DL (ref 31.5–35.7)
MCV RBC AUTO: 105 FL (ref 79–97)
METHGB BLD QL: -1 % (ref 0–1.5)
METHGB BLD QL: 0.4 % (ref 0–1.5)
METHGB BLD QL: 0.4 % (ref 0–1.5)
MODALITY: ABNORMAL
MONOCYTES # BLD AUTO: 1.84 10*3/MM3 (ref 0.1–0.9)
MONOCYTES NFR BLD AUTO: 5.3 % (ref 5–12)
NEUTROPHILS NFR BLD AUTO: 23.93 10*3/MM3 (ref 1.7–7)
NEUTROPHILS NFR BLD AUTO: 69.6 % (ref 42.7–76)
NOTE: ABNORMAL
NOTIFIED BY: ABNORMAL
NOTIFIED WHO: ABNORMAL
NRBC BLD AUTO-RTO: 0.1 /100 WBC (ref 0–0.2)
OXYHGB MFR BLDV: 66 % (ref 94–99)
OXYHGB MFR BLDV: 69.5 % (ref 94–99)
OXYHGB MFR BLDV: 81.5 % (ref 94–99)
PAW @ PEAK INSP FLOW SETTING VENT: 0 CMH2O
PCO2 BLDA: 46.2 MM HG (ref 35–45)
PCO2 BLDA: 55.4 MM HG (ref 35–45)
PCO2 BLDA: 62.1 MM HG (ref 35–45)
PCO2 TEMP ADJ BLD: 46.2 MM HG (ref 35–48)
PCO2 TEMP ADJ BLD: 55.4 MM HG (ref 35–48)
PCO2 TEMP ADJ BLD: 62.1 MM HG (ref 35–48)
PEEP RESPIRATORY: 10 CM[H2O]
PEEP RESPIRATORY: 8 CM[H2O]
PH BLDA: 6.83 PH UNITS (ref 7.35–7.45)
PH BLDA: 6.95 PH UNITS (ref 7.35–7.45)
PH BLDA: 7.09 PH UNITS (ref 7.35–7.45)
PH, TEMP CORRECTED: 6.83 PH UNITS
PH, TEMP CORRECTED: 6.95 PH UNITS
PH, TEMP CORRECTED: 7.09 PH UNITS
PHOSPHATE SERPL-MCNC: 10.5 MG/DL (ref 2.5–4.5)
PLAT MORPH BLD: NORMAL
PLATELET # BLD AUTO: 303 10*3/MM3 (ref 140–450)
PMV BLD AUTO: 10.9 FL (ref 6–12)
PO2 BLDA: 42.6 MM HG (ref 83–108)
PO2 BLDA: 61.7 MM HG (ref 83–108)
PO2 BLDA: 65.6 MM HG (ref 83–108)
PO2 TEMP ADJ BLD: 42.6 MM HG (ref 83–108)
PO2 TEMP ADJ BLD: 61.7 MM HG (ref 83–108)
PO2 TEMP ADJ BLD: 65.6 MM HG (ref 83–108)
POTASSIUM SERPL-SCNC: 4.8 MMOL/L (ref 3.5–5.2)
POTASSIUM SERPL-SCNC: 4.9 MMOL/L (ref 3.5–5.2)
PROCALCITONIN SERPL-MCNC: 3.77 NG/ML (ref 0–0.25)
PROT SERPL-MCNC: 5.1 G/DL (ref 6–8.5)
PROT SERPL-MCNC: 5.2 G/DL (ref 6–8.5)
PROTHROMBIN TIME: 17.7 SECONDS (ref 11.5–14)
RBC # BLD AUTO: 3.59 10*6/MM3 (ref 4.14–5.8)
RBC MORPH BLD: NORMAL
S PNEUM AG SPEC QL LA: NEGATIVE
SMUDGE CELLS BLD QL SMEAR: NORMAL
SODIUM SERPL-SCNC: 147 MMOL/L (ref 136–145)
SODIUM SERPL-SCNC: 151 MMOL/L (ref 136–145)
TOTAL RATE: 0 BREATHS/MINUTE
TROPONIN T SERPL-MCNC: 1.24 NG/ML (ref 0–0.03)
UFH PPP CHRO-ACNC: 0.12 IU/ML (ref 0.3–0.7)
UFH PPP CHRO-ACNC: 0.23 IU/ML (ref 0.3–0.7)
VENTILATOR MODE: ABNORMAL
VENTILATOR MODE: ABNORMAL
WBC # BLD AUTO: 34.4 10*3/MM3 (ref 3.4–10.8)
WHOLE BLOOD HOLD SPECIMEN: NORMAL
WHOLE BLOOD HOLD SPECIMEN: NORMAL

## 2021-01-01 PROCEDURE — 83605 ASSAY OF LACTIC ACID: CPT | Performed by: EMERGENCY MEDICINE

## 2021-01-01 PROCEDURE — 84484 ASSAY OF TROPONIN QUANT: CPT | Performed by: EMERGENCY MEDICINE

## 2021-01-01 PROCEDURE — 85379 FIBRIN DEGRADATION QUANT: CPT | Performed by: NURSE PRACTITIONER

## 2021-01-01 PROCEDURE — 71045 X-RAY EXAM CHEST 1 VIEW: CPT

## 2021-01-01 PROCEDURE — 83735 ASSAY OF MAGNESIUM: CPT | Performed by: NURSE PRACTITIONER

## 2021-01-01 PROCEDURE — 25010000002 EPINEPHRINE 1 MG/10ML SOLUTION PREFILLED SYRINGE: Performed by: EMERGENCY MEDICINE

## 2021-01-01 PROCEDURE — 82962 GLUCOSE BLOOD TEST: CPT

## 2021-01-01 PROCEDURE — 83050 HGB METHEMOGLOBIN QUAN: CPT

## 2021-01-01 PROCEDURE — P9041 ALBUMIN (HUMAN),5%, 50ML: HCPCS | Performed by: INTERNAL MEDICINE

## 2021-01-01 PROCEDURE — 82375 ASSAY CARBOXYHB QUANT: CPT

## 2021-01-01 PROCEDURE — 99291 CRITICAL CARE FIRST HOUR: CPT | Performed by: INTERNAL MEDICINE

## 2021-01-01 PROCEDURE — 85520 HEPARIN ASSAY: CPT

## 2021-01-01 PROCEDURE — 84145 PROCALCITONIN (PCT): CPT | Performed by: NURSE PRACTITIONER

## 2021-01-01 PROCEDURE — 25010000002 PIPERACILLIN SOD-TAZOBACTAM PER 1 G: Performed by: NURSE PRACTITIONER

## 2021-01-01 PROCEDURE — 87899 AGENT NOS ASSAY W/OPTIC: CPT | Performed by: NURSE PRACTITIONER

## 2021-01-01 PROCEDURE — 85610 PROTHROMBIN TIME: CPT | Performed by: EMERGENCY MEDICINE

## 2021-01-01 PROCEDURE — 85730 THROMBOPLASTIN TIME PARTIAL: CPT | Performed by: NURSE PRACTITIONER

## 2021-01-01 PROCEDURE — 80076 HEPATIC FUNCTION PANEL: CPT | Performed by: NURSE PRACTITIONER

## 2021-01-01 PROCEDURE — 94799 UNLISTED PULMONARY SVC/PX: CPT

## 2021-01-01 PROCEDURE — 99222 1ST HOSP IP/OBS MODERATE 55: CPT | Performed by: INTERNAL MEDICINE

## 2021-01-01 PROCEDURE — 5A1935Z RESPIRATORY VENTILATION, LESS THAN 24 CONSECUTIVE HOURS: ICD-10-PCS | Performed by: INTERNAL MEDICINE

## 2021-01-01 PROCEDURE — 63710000001 INSULIN LISPRO (HUMAN) PER 5 UNITS: Performed by: INTERNAL MEDICINE

## 2021-01-01 PROCEDURE — 82805 BLOOD GASES W/O2 SATURATION: CPT

## 2021-01-01 PROCEDURE — 25010000002 EPINEPHRINE 1 MG/ML SOLUTION 30 ML VIAL: Performed by: EMERGENCY MEDICINE

## 2021-01-01 PROCEDURE — 85025 COMPLETE CBC W/AUTO DIFF WBC: CPT | Performed by: EMERGENCY MEDICINE

## 2021-01-01 PROCEDURE — 85520 HEPARIN ASSAY: CPT | Performed by: NURSE PRACTITIONER

## 2021-01-01 PROCEDURE — 25010000002 HEPARIN (PORCINE) 25000-0.45 UT/250ML-% SOLUTION: Performed by: NURSE PRACTITIONER

## 2021-01-01 PROCEDURE — 25010000002 FENTANYL CITRATE (PF) 2500 MCG/50ML SOLUTION: Performed by: INTERNAL MEDICINE

## 2021-01-01 PROCEDURE — 25010000002 PHENYLEPHRINE 10 MG/ML SOLUTION: Performed by: NURSE PRACTITIONER

## 2021-01-01 PROCEDURE — 84100 ASSAY OF PHOSPHORUS: CPT | Performed by: NURSE PRACTITIONER

## 2021-01-01 PROCEDURE — 82728 ASSAY OF FERRITIN: CPT | Performed by: NURSE PRACTITIONER

## 2021-01-01 PROCEDURE — 94002 VENT MGMT INPAT INIT DAY: CPT

## 2021-01-01 PROCEDURE — 92950 HEART/LUNG RESUSCITATION CPR: CPT

## 2021-01-01 PROCEDURE — 83615 LACTATE (LD) (LDH) ENZYME: CPT | Performed by: NURSE PRACTITIONER

## 2021-01-01 PROCEDURE — 85007 BL SMEAR W/DIFF WBC COUNT: CPT | Performed by: EMERGENCY MEDICINE

## 2021-01-01 PROCEDURE — 99284 EMERGENCY DEPT VISIT MOD MDM: CPT

## 2021-01-01 PROCEDURE — 25010000002 ALBUMIN HUMAN 5% PER 50 ML: Performed by: INTERNAL MEDICINE

## 2021-01-01 PROCEDURE — 87040 BLOOD CULTURE FOR BACTERIA: CPT | Performed by: NURSE PRACTITIONER

## 2021-01-01 PROCEDURE — 80053 COMPREHEN METABOLIC PANEL: CPT | Performed by: EMERGENCY MEDICINE

## 2021-01-01 PROCEDURE — 36600 WITHDRAWAL OF ARTERIAL BLOOD: CPT

## 2021-01-01 RX ORDER — ATORVASTATIN CALCIUM 40 MG/1
40 TABLET, FILM COATED ORAL DAILY
Status: DISCONTINUED | OUTPATIENT
Start: 2021-01-01 | End: 2021-01-01

## 2021-01-01 RX ORDER — NOREPINEPHRINE BIT/0.9 % NACL 8 MG/250ML
.02-.3 INFUSION BOTTLE (ML) INTRAVENOUS
Status: DISCONTINUED | OUTPATIENT
Start: 2021-01-01 | End: 2021-01-01 | Stop reason: HOSPADM

## 2021-01-01 RX ORDER — DEXTROSE MONOHYDRATE 25 G/50ML
25-50 INJECTION, SOLUTION INTRAVENOUS
Status: DISCONTINUED | OUTPATIENT
Start: 2021-01-01 | End: 2021-01-01 | Stop reason: SDUPTHER

## 2021-01-01 RX ORDER — EPINEPHRINE 0.1 MG/ML
SYRINGE (ML) INJECTION
Status: COMPLETED | OUTPATIENT
Start: 2021-01-01 | End: 2021-01-01

## 2021-01-01 RX ORDER — HEPARIN SODIUM 10000 [USP'U]/100ML
9 INJECTION, SOLUTION INTRAVENOUS
Status: DISCONTINUED | OUTPATIENT
Start: 2021-01-01 | End: 2021-01-01 | Stop reason: HOSPADM

## 2021-01-01 RX ORDER — LIDOCAINE HYDROCHLORIDE 10 MG/ML
INJECTION, SOLUTION EPIDURAL; INFILTRATION; INTRACAUDAL; PERINEURAL
Status: COMPLETED
Start: 2021-01-01 | End: 2021-01-01

## 2021-01-01 RX ORDER — SODIUM CHLORIDE 9 MG/ML
30 INJECTION, SOLUTION INTRAVENOUS CONTINUOUS PRN
Status: DISCONTINUED | OUTPATIENT
Start: 2021-01-01 | End: 2021-01-01

## 2021-01-01 RX ORDER — LIDOCAINE HYDROCHLORIDE 20 MG/ML
JELLY TOPICAL ONCE
Status: DISCONTINUED | OUTPATIENT
Start: 2021-01-01 | End: 2021-01-01 | Stop reason: SDUPTHER

## 2021-01-01 RX ORDER — ACETAMINOPHEN 325 MG/1
650 TABLET ORAL EVERY 4 HOURS PRN
Status: DISCONTINUED | OUTPATIENT
Start: 2021-01-01 | End: 2021-01-01

## 2021-01-01 RX ORDER — DEXAMETHASONE SODIUM PHOSPHATE 4 MG/ML
6 INJECTION, SOLUTION INTRA-ARTICULAR; INTRALESIONAL; INTRAMUSCULAR; INTRAVENOUS; SOFT TISSUE
Status: DISCONTINUED | OUTPATIENT
Start: 2021-01-01 | End: 2021-01-01

## 2021-01-01 RX ORDER — CHLORHEXIDINE GLUCONATE 0.12 MG/ML
15 RINSE ORAL EVERY 12 HOURS SCHEDULED
Status: DISCONTINUED | OUTPATIENT
Start: 2021-01-01 | End: 2021-01-01

## 2021-01-01 RX ORDER — SODIUM CHLORIDE 0.9 % (FLUSH) 0.9 %
10 SYRINGE (ML) INJECTION EVERY 12 HOURS SCHEDULED
Status: DISCONTINUED | OUTPATIENT
Start: 2021-01-01 | End: 2021-01-01

## 2021-01-01 RX ORDER — PANTOPRAZOLE SODIUM 40 MG/10ML
40 INJECTION, POWDER, LYOPHILIZED, FOR SOLUTION INTRAVENOUS
Status: DISCONTINUED | OUTPATIENT
Start: 2021-01-01 | End: 2021-01-01

## 2021-01-01 RX ORDER — SODIUM CHLORIDE 0.9 % (FLUSH) 0.9 %
10 SYRINGE (ML) INJECTION AS NEEDED
Status: DISCONTINUED | OUTPATIENT
Start: 2021-01-01 | End: 2021-01-01

## 2021-01-01 RX ORDER — ASPIRIN 81 MG/1
81 TABLET ORAL DAILY
Status: DISCONTINUED | OUTPATIENT
Start: 2021-01-01 | End: 2021-01-01

## 2021-01-01 RX ORDER — ONDANSETRON 2 MG/ML
4 INJECTION INTRAMUSCULAR; INTRAVENOUS EVERY 6 HOURS PRN
Status: DISCONTINUED | OUTPATIENT
Start: 2021-01-01 | End: 2021-01-01

## 2021-01-01 RX ORDER — SODIUM CHLORIDE 0.9 % (FLUSH) 0.9 %
30 SYRINGE (ML) INJECTION ONCE AS NEEDED
Status: DISCONTINUED | OUTPATIENT
Start: 2021-01-01 | End: 2021-01-01

## 2021-01-01 RX ORDER — ALBUTEROL SULFATE 90 UG/1
2 AEROSOL, METERED RESPIRATORY (INHALATION) EVERY 6 HOURS PRN
Status: DISCONTINUED | OUTPATIENT
Start: 2021-01-01 | End: 2021-01-01

## 2021-01-01 RX ORDER — DEXTROSE MONOHYDRATE 25 G/50ML
25 INJECTION, SOLUTION INTRAVENOUS
Status: DISCONTINUED | OUTPATIENT
Start: 2021-01-01 | End: 2021-01-01

## 2021-01-01 RX ORDER — ALBUMIN, HUMAN INJ 5% 5 %
500 SOLUTION INTRAVENOUS ONCE
Status: COMPLETED | OUTPATIENT
Start: 2021-01-01 | End: 2021-01-01

## 2021-01-01 RX ORDER — LEVOTHYROXINE SODIUM 0.03 MG/1
25 TABLET ORAL DAILY
Status: DISCONTINUED | OUTPATIENT
Start: 2021-01-01 | End: 2021-01-01

## 2021-01-01 RX ORDER — ACETAMINOPHEN 650 MG/1
650 SUPPOSITORY RECTAL EVERY 4 HOURS PRN
Status: DISCONTINUED | OUTPATIENT
Start: 2021-01-01 | End: 2021-01-01

## 2021-01-01 RX ORDER — HEPARIN SODIUM 5000 [USP'U]/ML
5000 INJECTION, SOLUTION INTRAVENOUS; SUBCUTANEOUS EVERY 8 HOURS SCHEDULED
Status: DISCONTINUED | OUTPATIENT
Start: 2021-01-01 | End: 2021-01-01

## 2021-01-01 RX ORDER — PHENYLEPHRINE HCL IN 0.9% NACL 0.5 MG/5ML
.5-3 SYRINGE (ML) INTRAVENOUS
Status: DISCONTINUED | OUTPATIENT
Start: 2021-01-01 | End: 2021-01-01 | Stop reason: HOSPADM

## 2021-01-01 RX ORDER — NICOTINE POLACRILEX 4 MG
15 LOZENGE BUCCAL
Status: DISCONTINUED | OUTPATIENT
Start: 2021-01-01 | End: 2021-01-01

## 2021-01-01 RX ORDER — ONDANSETRON 4 MG/1
4 TABLET, FILM COATED ORAL EVERY 6 HOURS PRN
Status: DISCONTINUED | OUTPATIENT
Start: 2021-01-01 | End: 2021-01-01

## 2021-01-01 RX ORDER — LIDOCAINE HYDROCHLORIDE 20 MG/ML
JELLY TOPICAL ONCE
Status: DISCONTINUED | OUTPATIENT
Start: 2021-01-01 | End: 2021-01-01

## 2021-01-01 RX ADMIN — EPINEPHRINE 1 MG: 0.1 INJECTION, SOLUTION ENDOTRACHEAL; INTRACARDIAC; INTRAVENOUS at 14:27

## 2021-01-01 RX ADMIN — Medication 0.3 MCG/KG/MIN: at 22:24

## 2021-01-01 RX ADMIN — PHENYLEPHRINE HYDROCHLORIDE 3 MCG/KG/MIN: 10 INJECTION, SOLUTION INTRAVENOUS at 23:52

## 2021-01-01 RX ADMIN — HEPARIN SODIUM 9 UNITS/KG/HR: 10000 INJECTION, SOLUTION INTRAVENOUS at 16:20

## 2021-01-01 RX ADMIN — SODIUM BICARBONATE 150 MEQ: 84 INJECTION INTRAVENOUS at 23:13

## 2021-01-01 RX ADMIN — ALBUMIN HUMAN 500 ML: 0.05 INJECTION, SOLUTION INTRAVENOUS at 17:36

## 2021-01-01 RX ADMIN — INSULIN LISPRO 2 UNITS: 100 INJECTION, SOLUTION INTRAVENOUS; SUBCUTANEOUS at 17:35

## 2021-01-01 RX ADMIN — EPINEPHRINE 1 MG: 0.1 INJECTION, SOLUTION ENDOTRACHEAL; INTRACARDIAC; INTRAVENOUS at 14:35

## 2021-01-01 RX ADMIN — PHENYLEPHRINE HYDROCHLORIDE 0.5 MCG/KG/MIN: 10 INJECTION, SOLUTION INTRAVENOUS at 20:53

## 2021-01-01 RX ADMIN — SODIUM BICARBONATE 100 MEQ: 84 INJECTION, SOLUTION INTRAVENOUS at 20:42

## 2021-01-01 RX ADMIN — FENTANYL CITRATE 50 MCG/HR: 0.05 INJECTION, SOLUTION INTRAMUSCULAR; INTRAVENOUS at 17:36

## 2021-01-01 RX ADMIN — ATORVASTATIN CALCIUM 40 MG: 40 TABLET, FILM COATED ORAL at 17:03

## 2021-01-01 RX ADMIN — ASPIRIN 81 MG: 81 TABLET, COATED ORAL at 17:03

## 2021-01-01 RX ADMIN — TAZOBACTAM SODIUM AND PIPERACILLIN SODIUM 3.38 G: 375; 3 INJECTION, SOLUTION INTRAVENOUS at 17:03

## 2021-01-01 RX ADMIN — CHLORHEXIDINE GLUCONATE 15 ML: 1.2 SOLUTION ORAL at 20:43

## 2021-01-01 RX ADMIN — PANTOPRAZOLE SODIUM 40 MG: 40 INJECTION, POWDER, FOR SOLUTION INTRAVENOUS at 17:02

## 2021-01-01 RX ADMIN — EPINEPHRINE 0.3 MCG/KG/MIN: 1 INJECTION PARENTERAL at 20:53

## 2021-01-01 RX ADMIN — Medication 0.02 MCG/KG/MIN: at 16:10

## 2021-01-01 RX ADMIN — LEVOTHYROXINE SODIUM 25 MCG: 25 TABLET ORAL at 17:03

## 2021-01-01 RX ADMIN — LIDOCAINE HYDROCHLORIDE: 10 INJECTION, SOLUTION EPIDURAL; INFILTRATION; INTRACAUDAL; PERINEURAL at 16:03

## 2021-01-01 RX ADMIN — SODIUM CHLORIDE, PRESERVATIVE FREE 10 ML: 5 INJECTION INTRAVENOUS at 20:43

## 2021-01-01 RX ADMIN — INSULIN HUMAN 4.2 UNITS/HR: 1 INJECTION, SOLUTION INTRAVENOUS at 21:22

## 2021-02-13 PROBLEM — U07.1 COVID-19: Status: ACTIVE | Noted: 2021-01-01

## 2021-02-13 PROBLEM — I46.9 CARDIAC ARREST (HCC): Status: ACTIVE | Noted: 2021-01-01

## 2021-02-13 PROBLEM — U07.1 COVID-19: Status: RESOLVED | Noted: 2021-01-01 | Resolved: 2021-01-01

## 2021-02-13 PROBLEM — J96.01 ACUTE RESPIRATORY FAILURE WITH HYPOXIA (HCC): Status: ACTIVE | Noted: 2021-01-01

## 2021-02-13 PROBLEM — R79.89 ELEVATED LFTS: Status: ACTIVE | Noted: 2021-01-01

## 2021-02-13 PROBLEM — U07.1 PNEUMONIA DUE TO COVID-19 VIRUS: Status: ACTIVE | Noted: 2021-01-01

## 2021-02-13 PROBLEM — G93.40 ACUTE ENCEPHALOPATHY: Status: ACTIVE | Noted: 2021-01-01

## 2021-02-13 PROBLEM — N17.9 AKI (ACUTE KIDNEY INJURY) (HCC): Status: ACTIVE | Noted: 2021-01-01

## 2021-02-13 PROBLEM — E87.20 LACTIC ACIDOSIS: Status: ACTIVE | Noted: 2021-01-01

## 2021-02-13 PROBLEM — J12.82 PNEUMONIA DUE TO COVID-19 VIRUS: Status: ACTIVE | Noted: 2021-01-01

## 2021-02-13 NOTE — PROGRESS NOTES
"Pharmacy Consult-Vancomycin Dosing  Luis Bloom is a  65 y.o. male receiving vancomycin therapy.     Indication: PNA   Consulting Provider: Gisell Wild APRN  ID Consult: No     Goal Trough: 15 - 20 mcg/ ml     Current Antimicrobial Therapy  Anti-Infectives (From admission, onward)      Ordered     Dose/Rate Route Frequency Start Stop    02/13/21 1558  piperacillin-tazobactam (ZOSYN) 3.375 g in iso-osmotic dextrose 50 ml (premix)     Ordering Provider: Gisell Wild APRN    3.375 g  over 4 Hours Intravenous Every 8 Hours 02/14/21 0000 02/20/21 2359    02/13/21 1558  piperacillin-tazobactam (ZOSYN) 3.375 g in iso-osmotic dextrose 50 ml (premix)     Ordering Provider: Gisell Wild APRN    3.375 g  over 30 Minutes Intravenous Once 02/13/21 1645      02/13/21 1558  Pharmacy to dose vancomycin     Ordering Provider: Gisell Wild APRN     Does not apply Continuous PRN 02/13/21 1558 02/20/21 1557            Allergies  Allergies as of 02/13/2021 - Reviewed 02/13/2021   Allergen Reaction Noted    Fish-derived products  12/30/2016       Labs    Results from last 7 days   Lab Units 02/13/21  1450   BUN mg/dL 44*   CREATININE mg/dL 2.10*       Results from last 7 days   Lab Units 02/13/21  1450   WBC 10*3/mm3 34.40*       Evaluation of Dosing     Last Dose Received in the ED/Outside Facility: 1700 mg on 2/12 @ 1856  Is Patient on Dialysis or Renal Replacement: No     Ht - 172.7 cm (68\")  Wt - 107 kg (234 lb 12.6 oz)    Estimated Creatinine Clearance: 40.5 mL/min (A) (by C-G formula based on SCr of 2.1 mg/dL (H)).    Intake & Output (last 3 days)         02/10 0701 - 02/11 0700 02/11 0701 - 02/12 0700 02/12 0701 - 02/13 0700 02/13 0701 - 02/14 0700    Urine (mL/kg/hr)    500    Total Output    500    Net    -500                    Microbiology and Radiology  Microbiology Results (last 10 days)       ** No results found for the last 240 hours. **            Vancomycin Levels:              "     Assessment/Plan:     Vancomycin therapy was initiated for PNA.   Patients currently has LISANDRO. Patients previous creatinine was 1.7 on 2/13 @ 1140 (from [facility]). At 1450 it was 2.10.   Patient received a one time does of vancomycin of 1700 mg (~ 15 mg/kg) at [facility] on 2/12 @ 1856. Due to LISANDRO will be dosing vancomycin by levels.   Obtain vancomycin random with am labs.    Monitor for changes in renal function, cultures, and clinical status.   Pharmacy will follow.     Thank you,     Raymond Sarabia, Pharm D  Pharmacy Resident   2/13/2021  17:28 EST

## 2021-02-13 NOTE — PROGRESS NOTES
HEPARIN INFUSION  Luis Bloom is a  65 y.o. male receiving heparin infusion.             Therapy for (VTE/Cardiac):   Cardiac  Patient Weight: 107 kg   Initial Bolus (Y/N):   No  Any Bolus (Y/N):   No     Signs or Symptoms of Bleeding: No       Cardiac or Other (Not VTE)   Initial Bolus: 60 units/kg (Max 4,000 units)  Initial rate: 12 units/kg/hr (Max 1,000 units/hr)   Anti-Xa (IU/mL) Bolus Dose Stop Infusion Rate Change Repeat Anti-Xa      ?0.19 60 units/kg 0 hrs Increase rate by   4 units/kg/hr 6 hrs       0.2 - 0.29  30 units/kg 0 hrs Increase rate by 2 units/kg/hr 6 hrs    0.3 - 0.7 0 0 hrs No change 6 hrs      0.71 - 0.99 0  0 hrs Decrease rate by 2 units/kg/hr 6 hrs            ?1 0 Hold 1 hr Decrease rate by 3 units/kg/hr 6 hrs      Recommend Xa every 6 hours.       Results from last 7 days   Lab Units 02/13/21  1450   INR  1.49*   HEMOGLOBIN g/dL 10.9*   HEMATOCRIT % 37.7   PLATELETS 10*3/mm3 303          Date   Time   Anti-Xa Current Rate (Unit/kg/hr) Bolus   (Units) Rate Change   (Unit/kg/hr) New Rate (Unit/kg/hr) Next   Anti-Xa Comments  Pump Check Daily   2/13 1609 0.12 0 0 +9 9 2200 DW Nurse                                                                                                                                                                                                                                  Raymond Sarabia, Pharm D  Pharmacy Resident   2/13/2021  17:46 EST

## 2021-02-13 NOTE — PLAN OF CARE
Problem: Adult Inpatient Plan of Care  Goal: Plan of Care Review  Outcome: Ongoing, Progressing  Flowsheets (Taken 2/13/2021 1808)  Progress: declining  Plan of Care Reviewed With: patient  Outcome Summary: Pt arrived from [facility] via [facility] ED s/p cardiac arrest.  GCS 3, substernal restractions.  ST, SBP >80. Temp 91.  Levo@0.16, Epi@0.3, HCO3@100ml, Hepgtt@9, Fent@50.  500ml of albumin given.  ABX ordered and given.  Core Temp management initiated at 1730. Schaffer cath changed upon arrival, 0 UOP.  No BM. O2 sats >76%.   Goal Outcome Evaluation:  Plan of Care Reviewed With: patient

## 2021-02-13 NOTE — ED PROVIDER NOTES
Subjective   This is a 65-year-old male brought to the emergency room today by [facility].  He was a direct transfer from the [facility] ICU to our ICU or catheterization lab but when they pulled in the parking lot here at [facility] apparently the patient lost his pulse and he was brought to the ER here for cardiac arrest.    I have very little information on the patient but apparently he was admitted to [facility] in the past 1 to 2 days with Covid pneumonia.  He apparently progressed rapidly and was intubated and at some point had cardiac arrest requiring resuscitation there he also had a central line placed was intubated and was on multiple pressors.  It is unclear how long a period of cardiac arrest he had.  Was unclear of the etiology of cardiac arrest.  Apparently with the patient's coronary artery history it was thought that he might have sustained a myocardial infarction and he was transported here for higher level of care accepted by Dr. Ye.    .    On arrival here the patient was receiving ventilation through his endotracheal tube and had pink frothy discharge from it.  He had bilateral breath sounds with insufflation but quite diminished.  He seemed to be making occasional respiratory efforts that were apneic and he was pulseless and cool to the touch.  CPR had been begun in ambulance bay.  He was on a Versed drip which was stopped he was on a epinephrine drip that was continued he was on a bicarbonate drip that was continued.  He was also on a nitroglycerin drip and this was stopped.    CPR was continued and I performed a rapid 1 view echocardiogram which showed spontaneous cardiac activity I could not detect a pericardial effusion.  I then looked at the lung windows anteriorly 1 view and he did have movement of the pleura with ventilation and I thought the likelihood of the pneumothorax was poor.  However he did not have a palpable pulse and CPR was continued he was given multiple rounds  of code medicines including epinephrine and sodium bicarb.    During this time he had a narrow complex tachycardia.  Eventually he regained spontaneous circulation.  The patient was seen in consultation by Dr. Ye and by the critical care service please see their separate notes.          Review of Systems   Unable to perform ROS: Intubated       Past Medical History:   Diagnosis Date   • Brain aneurysm 2000   • CAD (coronary artery disease)    • Dyslipidemia    • GERD (gastroesophageal reflux disease)     Hiatal hernia    • Hiatal hernia    • [place] (hard of hearing)    • Hypertension    • Nephrolithiasis    • Obesity     BMI 36   • Wears dentures    • Wears glasses      PROBLEM LIST:  1. Coronary artery disease:  a. [facility], 07/14/2008, Dr. Ye: 99% mid segment stenosis of the LAD with 90% stenosis of the first diagonal and total mid segment occlusion of the nondominant-RCA. The circumflex had nonobstructive disease and LV function was normal with EF 65%. Subsequent stenting of the LAD with 3.0 x 33mm Cypher stent proximally expanded to 4.0mm with balloon angioplasty of the diagonal, which remained patent.   b. [facility], 07/14/2011: No reversible ischemia, normal LV systolic function.   c. [facility], 08/14/2018: 60 perfusion defect mid to basal inferior wall. No reversible ischemia. EF 59%.  d. Echocardiogram, 08/14/2018: EF 55-60%. Mild anterior wall hypokinesis. Mildly calcified aortic valve leaflets, no significant stenosis. Poor quality study.  2. Palpitations:  a. SR with rare PVCs and frequent PACs (4.3%). No significant arrhythmias.  3. Hypertension.  4. Dyslipidemia.  5. DM2.  6. Former smoker, cessation in 2008.  7. Obesity.  8. Lung cancer, squamous cell carcinoma:  a. S/p right upper lobectomy, 12/29/2016.  9. GERD.  10. Hiatal hernia:  a. Upper GI series, 08/12/2008: Hiatal hernia with severe esophageal reflux with distention all the way to the thoracic inlet without aspiration   11. Brain aneurysm, followed at  [facility]:  a. MRI with contrast, 2019: Stable 8 mm aneurysm along the proximal basilar artery.     From [facility] note dec 2019  Allergies   Allergen Reactions   • Fish-Derived Products        Past Surgical History:   Procedure Laterality Date   • APPENDECTOMY     • BRONCHOSCOPY THORACOTOMY Right 2016    Procedure: Right thoracotomy, lobectomy, Bronchoscopy;  Surgeon: Sanjay De La Fuente MD;  Location:  [facility];  Service:    • BRONCHOSCOPY THORACOTOMY Right 1/15/2017    Procedure: BRONCHOSCOPY THORACOTOMY;  Surgeon: Sanjay De La Fuente MD;  Location:  [facility];  Service:    • CEREBRAL ANEURYSM REPAIR Right     center, back of brain, coiled   • COLONOSCOPY N/A 2017    Procedure: COLONOSCOPY;  Surgeon: Tremaine Lin MD;  Location:  [facility];  Service:    • CORONARY STENT PLACEMENT  2008    X1   • KIDNEY STONE SURGERY     • LUNG LOBECTOMY Right 2016    RUL   • ORTHOPEDIC SURGERY      bilateral elbows   • OTHER SURGICAL HISTORY      Right and left elbow surgery        Family History   Problem Relation Age of Onset   • Lung cancer Mother    • Diabetes Mother    • Coronary artery disease Father    • Hyperlipidemia Father    • Hypertension Father        Social History     Socioeconomic History   • Marital status:      Spouse name: Not on file   • Number of children: 3   • Years of education: Not on file   • Highest education level: Not on file   Occupational History   • Occupation: DISABLED/ARM INJURY   Tobacco Use   • Smoking status: Former Smoker     Packs/day: 2.00     Years: 45.00     Pack years: 90.00     Types: Cigarettes     Quit date:      Years since quittin.1   • Smokeless tobacco: Current User     Types: Chew   • Tobacco comment: Quit in    Substance and Sexual Activity   • Alcohol use: No     Alcohol/week: 10.0 standard drinks     Types: 10 Cans of beer per week     Comment: no longer   • Drug use: No   • Sexual activity: Defer           Objective   Physical Exam  Vitals  signs reviewed. Exam conducted with a chaperone present.   Constitutional:       Appearance: He is obese.      Comments: Older man who looks considerably different than his picture in the epic chart he has active CPR in progress.  There is pink frothy thick secretions from his endotracheal tube.  He has bilateral breath sounds with insufflation though they are diminished.  He is cool to the touch.   HENT:      Head: Normocephalic and atraumatic.      Right Ear: External ear normal.      Left Ear: External ear normal.      Nose: Nose normal.      Mouth/Throat:      Comments: Endotracheal tube in place.  Eyes:      Comments: Pupils are small and fixed.  He has no extraocular movements.   Neck:      Comments: There is a right central line in place.  There is no active bleeding from it but he has considerable blood on the sheets in his back apparently he had bleeding at [facility] but I do not see active bleeding now.  Cardiovascular:      Comments: He has pulses and I do not hear heart sounds.  Pulmonary:      Comments: He has decreased bilateral breath sounds with rhonchi with insufflation of his endotracheal tube  Abdominal:      Comments: Obese and soft I do not appreciate a fluid wave or mass.   Musculoskeletal:      Comments: Is femoral pulses are present faintly with active CPR but without CPR no pulses were present.   Skin:     Capillary Refill: Capillary refill takes more than 3 seconds.   Neurological:      Comments: The patient is obtunded.         Critical Care  Performed by: Vijay Demarco MD  Authorized by: Vijay Demarco MD     Critical care provider statement:     Critical care time (minutes):  45    Critical care was necessary to treat or prevent imminent or life-threatening deterioration of the following conditions:  Cardiac failure and circulatory failure    Critical care was time spent personally by me on the following activities:  Discussions with consultants, evaluation of patient's response  to treatment, examination of patient, obtaining history from patient or surrogate, ordering and review of laboratory studies, ordering and review of radiographic studies, ordering and performing treatments and interventions, pulse oximetry, re-evaluation of patient's condition and review of old charts               ED Course            Recent Results (from the past 24 hour(s))   Comprehensive Metabolic Panel    Collection Time: 02/13/21  2:50 PM    Specimen: Blood   Result Value Ref Range    Glucose 187 (H) 65 - 99 mg/dL    BUN 44 (H) 8 - 23 mg/dL    Creatinine 2.10 (H) 0.76 - 1.27 mg/dL    Sodium 151 (H) 136 - 145 mmol/L    Potassium 4.9 3.5 - 5.2 mmol/L    Chloride 110 (H) 98 - 107 mmol/L    CO2 14.0 (L) 22.0 - 29.0 mmol/L    Calcium 8.0 (L) 8.6 - 10.5 mg/dL    Total Protein 5.2 (L) 6.0 - 8.5 g/dL    Albumin 2.40 (L) 3.50 - 5.20 g/dL    ALT (SGPT) 686 (H) 1 - 41 U/L    AST (SGOT) 853 (H) 1 - 40 U/L    Alkaline Phosphatase 157 (H) 39 - 117 U/L    Total Bilirubin 0.9 0.0 - 1.2 mg/dL    eGFR Non African Amer 32 (L) >60 mL/min/1.73    Globulin 2.8 gm/dL    A/G Ratio 0.9 g/dL    BUN/Creatinine Ratio 21.0 7.0 - 25.0    Anion Gap 27.0 (H) 5.0 - 15.0 mmol/L   Protime-INR    Collection Time: 02/13/21  2:50 PM    Specimen: Blood   Result Value Ref Range    Protime 17.7 (H) 11.5 - 14.0 Seconds    INR 1.49 (H) 0.85 - 1.16   Troponin    Collection Time: 02/13/21  2:50 PM    Specimen: Blood   Result Value Ref Range    Troponin T 1.240 (C) 0.000 - 0.030 ng/mL   Lactic Acid, Plasma    Collection Time: 02/13/21  2:50 PM    Specimen: Blood   Result Value Ref Range    Lactate 14.9 (C) 0.5 - 2.0 mmol/L   Light Blue Top    Collection Time: 02/13/21  2:50 PM   Result Value Ref Range    Extra Tube hold for add-on    Green Top (Gel)    Collection Time: 02/13/21  2:50 PM   Result Value Ref Range    Extra Tube Hold for add-ons.    Lavender Top    Collection Time: 02/13/21  2:50 PM   Result Value Ref Range    Extra Tube hold for add-on     Gold Top - SST    Collection Time: 02/13/21  2:50 PM   Result Value Ref Range    Extra Tube Hold for add-ons.    Gray Top - Ice    Collection Time: 02/13/21  2:50 PM   Result Value Ref Range    Extra Tube Hold for add-ons.    CBC Auto Differential    Collection Time: 02/13/21  2:50 PM    Specimen: Blood   Result Value Ref Range    WBC 34.40 (C) 3.40 - 10.80 10*3/mm3    RBC 3.59 (L) 4.14 - 5.80 10*6/mm3    Hemoglobin 10.9 (L) 13.0 - 17.7 g/dL    Hematocrit 37.7 37.5 - 51.0 %    .0 (H) 79.0 - 97.0 fL    MCH 30.4 26.6 - 33.0 pg    MCHC 28.9 (L) 31.5 - 35.7 g/dL    RDW 16.3 (H) 12.3 - 15.4 %    RDW-SD 63.1 (H) 37.0 - 54.0 fl    MPV 10.9 6.0 - 12.0 fL    Platelets 303 140 - 450 10*3/mm3    Neutrophil % 69.6 42.7 - 76.0 %    Lymphocyte % 19.5 (L) 19.6 - 45.3 %    Monocyte % 5.3 5.0 - 12.0 %    Eosinophil % 0.0 (L) 0.3 - 6.2 %    Basophil % 0.5 0.0 - 1.5 %    Immature Grans % 5.1 (H) 0.0 - 0.5 %    Neutrophils, Absolute 23.93 (H) 1.70 - 7.00 10*3/mm3    Lymphocytes, Absolute 6.70 (H) 0.70 - 3.10 10*3/mm3    Monocytes, Absolute 1.84 (H) 0.10 - 0.90 10*3/mm3    Eosinophils, Absolute 0.01 0.00 - 0.40 10*3/mm3    Basophils, Absolute 0.18 0.00 - 0.20 10*3/mm3    Immature Grans, Absolute 1.74 (H) 0.00 - 0.05 10*3/mm3    nRBC 0.1 0.0 - 0.2 /100 WBC   Scan Slide    Collection Time: 02/13/21  2:50 PM    Specimen: Blood   Result Value Ref Range    RBC Morphology Normal Normal    Smudge Cells Slight/1+ None Seen    Platelet Morphology Normal Normal   Magnesium    Collection Time: 02/13/21  2:50 PM    Specimen: Blood   Result Value Ref Range    Magnesium 2.7 (H) 1.6 - 2.4 mg/dL   Phosphorus    Collection Time: 02/13/21  2:50 PM    Specimen: Blood   Result Value Ref Range    Phosphorus 10.5 (H) 2.5 - 4.5 mg/dL   Procalcitonin    Collection Time: 02/13/21  2:50 PM    Specimen: Blood   Result Value Ref Range    Procalcitonin 3.77 (H) 0.00 - 0.25 ng/mL   aPTT    Collection Time: 02/13/21  2:50 PM    Specimen: Blood   Result Value  Ref Range    PTT 38.0 (L) 50.0 - 95.0 seconds   Heparin Anti-Xa    Collection Time: 02/13/21  2:50 PM    Specimen: Blood   Result Value Ref Range    Heparin Anti-Xa (UFH) 0.12 (L) 0.30 - 0.70 IU/ml   Blood Gas, Arterial With Co-Ox    Collection Time: 02/13/21  3:35 PM    Specimen: Arterial Blood   Result Value Ref Range    Site Left Radial     Geoff's Test N/A     pH, Arterial 6.829 (C) 7.350 - 7.450 pH units    pCO2, Arterial 62.1 (H) 35.0 - 45.0 mm Hg    pO2, Arterial 61.7 (L) 83.0 - 108.0 mm Hg    HCO3, Arterial 10.3 (L) 20.0 - 26.0 mmol/L    Base Excess, Arterial <-20.0 (L) 0.0 - 2.0 mmol/L    Hemoglobin, Blood Gas 11.3 (L) 13.5 - 17.5 g/dL    Hematocrit, Blood Gas 34.5 %    Oxyhemoglobin 69.5 (L) 94 - 99 %    Methemoglobin 0.40 0.00 - 1.50 %    Carboxyhemoglobin 0.7 0 - 2 %    CO2 Content 12.2 (L) 22 - 33 mmol/L    Temperature 37.0 C    Barometric Pressure for Blood Gas      Modality Ventilator     FIO2 100 %    Ventilator Mode VC+/AC     Rate 0 Breaths/minute    PEEP 8.0     PIP 0 cmH2O    IPAP 0     EPAP 0     Note      Notified Carlos RIOS MD     Notified By [id number]     Notified Time 02/13/2021 15:35     pH, Temp Corrected 6.829 pH Units    pCO2, Temperature Corrected 62.1 (H) 35 - 48 mm Hg    pO2, Temperature Corrected 61.7 (L) 83 - 108 mm Hg     Note: In addition to lab results from this visit, the labs listed above may include labs taken at another facility or during a different encounter within the last 24 hours. Please correlate lab times with ED admission and discharge times for further clarification of the services performed during this visit.    XR Chest 1 View   Preliminary Result   1. ET tube NG tube and right IJ catheter appear to be in good position.   2. left upper lobe pneumonia.   3. No visible pneumothorax.          XR Chest 1 View    (Results Pending)   XR Chest 1 View    (Results Pending)     Vitals:    02/13/21 1454 02/13/21 1514 02/13/21 1515 02/13/21 1600   BP:  101/76 99/74 (!) 87/55    Pulse: 104  100 101   Resp:       Temp:   (!) 91 °F (32.8 °C)    TempSrc:   Axillary    SpO2: (!) 45%   (!) 84%   Weight:    107 kg (234 lb 12.6 oz)   Height:         Medications   EPINEPHrine (ADRENALIN) 10 mg in sodium chloride 0.9 % 250 mL infusion (0.3 mcg/kg/min × 94 kg Intravenous Currently Infusing 2/13/21 1605)   sodium chloride 0.9 % flush 10 mL (has no administration in time range)   Lidocaine HCl Urethral/Mucosal 2% (XYLOCAINE) gel syringe ( Urethral Not Given 2/13/21 1604)   aspirin EC tablet 81 mg (has no administration in time range)   atorvastatin (LIPITOR) tablet 40 mg (has no administration in time range)   chlorhexidine (PERIDEX) 0.12 % solution 15 mL (has no administration in time range)   sodium chloride 0.9 % flush 10 mL (has no administration in time range)   sodium chloride 0.9 % flush 10 mL (has no administration in time range)   pantoprazole (PROTONIX) injection 40 mg (has no administration in time range)   acetaminophen (TYLENOL) tablet 650 mg (has no administration in time range)     Or   acetaminophen (TYLENOL) suppository 650 mg (has no administration in time range)   ondansetron (ZOFRAN) tablet 4 mg (has no administration in time range)     Or   ondansetron (ZOFRAN) injection 4 mg (has no administration in time range)   levothyroxine (SYNTHROID, LEVOTHROID) tablet 25 mcg (has no administration in time range)   Pharmacy Consult - Remdesivir (has no administration in time range)   dexamethasone (DECADRON) tablet 6 mg (has no administration in time range)     Or   dexamethasone (DECADRON) injection 6 mg (has no administration in time range)   albuterol sulfate HFA (PROVENTIL HFA;VENTOLIN HFA;PROAIR HFA) inhaler 2 puff (has no administration in time range)   heparin 04752 units/250 mL (100 units/mL) in 0.45 % NaCl infusion (9 Units/kg/hr × 107 kg Intravenous New Bag 2/13/21 1620)   Pharmacy to Dose Heparin (has no administration in time range)   Pharmacy to dose vancomycin (has no  administration in time range)   piperacillin-tazobactam (ZOSYN) 3.375 g in iso-osmotic dextrose 50 ml (premix) (has no administration in time range)   piperacillin-tazobactam (ZOSYN) 3.375 g in iso-osmotic dextrose 50 ml (premix) (has no administration in time range)   norepinephrine (LEVOPHED) 8 mg in 250 mL NS infusion (premix) (0.02 mcg/kg/min × 94 kg Intravenous New Bag 2/13/21 1610)   lidocaine PF 1% (XYLOCAINE) 1 % injection  - ADS Override Pull (  Given 2/13/21 1603)     ECG/EMG Results (last 24 hours)     ** No results found for the last 24 hours. **        No orders to display                                       MDM  Number of Diagnoses or Management Options  Cardiac arrest (CMS/HCC):   Diagnosis management comments:       As stated above CPR was continued and we eventually able to get return of spontaneous circulation.  I reperformed bedside echo which did show continued cardiac activity this time he had a palpable pulse.  I reviewed this with Dr. Ye.  No images were saved.    I personally reviewed the chest x-ray which showed his endotracheal tube and central line and NG tube which we placed here in ER seem to be in appropriate positions.  NG tube was placed as I worried the patient might have gastric distention precipitating his cardiac arrest as I had very little information regarding his previous care.    The patient was transported to the intensive care unit by our staff and ICU staff.  His prognosis is grim.  He had approximately 15 minutes of CPR total.       Amount and/or Complexity of Data Reviewed  Clinical lab tests: reviewed  Tests in the radiology section of CPT®: reviewed        Final diagnoses:   Cardiac arrest (CMS/HCC)            Vijay Demarco MD  02/13/21 1628       Vijay Demarco MD  02/13/21 1693

## 2021-02-13 NOTE — CONSULTS
[facility] at [facility]        Date of Hospital Visit: 21      Place of Service: [facility]    Patient Name: Luis Bloom  :1955    Referral Provider: Intensivisrt  Primary Care Provider: Meir Betancur MD    Chief complaint/Reason for Consultation: CAD/status post cardiopulmonary arrest.  Problem List:  Active Hospital Problems    Diagnosis   • **Cardiac arrest (CMS/[facility])   • Pneumonia due to COVID-19 virus   • Acute respiratory failure with hypoxia (CMS/[facility])   • Paroxysmal atrial fibrillation (CMS/HCC)   • CAD (coronary artery disease)     1. Cardiac cath by Dr. Ye on 2008 showed 99% mid segment stenosis of the LAD with 90% stenosis of the first diagonal and total mid  segment occlusion of the nondominant right coronary artery. The circumflex had nonobstructive disease and LV function was normal with  ejection fraction of 65%.   2. Subsequent stenting of the LAD with 3.0 x 33mm Cypher stent proximally expanded to 4.0mm with balloon angioplasty of the diagonal, which remained patent.   3. MPA on 11 showing no reversible ischemia, normal LV systolic function.   4. MPS 18: 60 perfusion defect mid to basal inferior wall.  No reversible ischemia.  EF 59%   • Dyslipidemia   • Hypertension   • Type 2 diabetes mellitus (CMS/[facility])   • Former smoker - quit in .   • Premature atrial contractions   • Murmur, cardiac     1.  Echocardiogram 18  · Ejection fraction 50-55%  · Aortic valve leaflets are mildly calcified  · Mild calcific aortic sclerosis without hemodynamically significant aortic stenosis   • Tobacco chew use   • Lung cancer, squamous cell carcinoma, status post right upper lobe lobectomy 2016   • GERD (gastroesophageal reflux disease)     Hiatal hernia   A. Upper GI series in 2008 showed hiatal hernia with severe esophageal reflux with distention all the way to the thoracic inlet without aspiration.    •  Brain aneurysm     1. Clipping of anterior communicating artery aneurysm in 2002  2. Attempted stent coil embolization of vertebral artery aneurysm, June 2017  3. 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  Post coil embolization in the region of the anterior communicating artery without evidence of recurrent aneurysm.     History of Present Illness:  This is a 65-year-old hypertensive dyslipidemic male with coronary artery disease and paroxysmal atrial fibrillation.  He is status post previous stent of the LAD.  He presented to [facility] emergency department yesterday complaining of progressive dyspnea and malaise.  He ruled in for COVID-19 pneumonia.  He was admitted to [facility] and was started on remdesivir and IV antibiotics.  His respiratory status continued to decline and he required endotracheal intubation earlier today after which he went into PEA/cardiopulmonary arrest.  He received ACLS for 35 minutes and regained pulse and blood pressure.  Prior to this episode he had also complained of severe shortness of breath and chest pressure and was noted to have ST segment depressions.  The attending physician Loan had discussed the case with me and we had recommended initiation of ACS standard of care therapy with heparin aspirin and Plavix however due to lack of IV access and subsequent bleeding from the central line site heparin was not started.  Patient's condition continued to decline, he was on multiple vasopressors and remained hypoxemic despite 100% FiO2 after intubation.  Decision was made to transfer him to the ICU for further management of his respiratory failure before considering cardiac catheterization study.  The patient arrived at the ER with EMS, according to them he was unstable during transfer but maintained saturation in 80% range and stable blood pressure.  In the ED the patient again  developed PEA cardiac arrest and required CPR.  At this time I was contacted and participated in his management in the ED with Dr. Smith.   After prolonged CPR and multiple doses of epinephrine the patient has now regained a pulse and a stable blood pressure.  There is no evidence of ST segment elevations or depressions on telemetry, he has experienced 2 cardiac arrests with probable prolonged brain hypoxemia therefore we have decided to admit him to the ICU to optimize his medical management before considering cardiac ablation study which is not indicated at present.     Past Surgical History:   Procedure Laterality Date   • APPENDECTOMY     • BRONCHOSCOPY THORACOTOMY Right 12/29/2016    Procedure: Right thoracotomy, lobectomy, Bronchoscopy;  Surgeon: Sanjay De La Fuente MD;  Location:  [facility];  Service:    • BRONCHOSCOPY THORACOTOMY Right 1/15/2017    Procedure: BRONCHOSCOPY THORACOTOMY;  Surgeon: Sanjay De La Fuente MD;  Location:  [facility];  Service:    • CEREBRAL ANEURYSM REPAIR Right     center, back of brain, coiled   • COLONOSCOPY N/A 5/30/2017    Procedure: COLONOSCOPY;  Surgeon: Tremaine Lin MD;  Location:  [facility];  Service:    • CORONARY STENT PLACEMENT  2008    X1   • KIDNEY STONE SURGERY     • LUNG LOBECTOMY Right 12/29/2016    RUL   • ORTHOPEDIC SURGERY      bilateral elbows   • OTHER SURGICAL HISTORY      Right and left elbow surgery        Allergies   Allergen Reactions   • Fish-Derived Products        Current Outpatient Medications   Medication Instructions   • aspirin 81 mg, Oral, Daily   • atorvastatin (LIPITOR) 40 mg, Oral, Daily   • hydroCHLOROthiazide (HYDRODIURIL) 25 mg, Oral, Daily   • HYDROcodone-acetaminophen (NORCO)  MG per tablet  tablets, Oral, Every 6 Hours PRN   • levothyroxine (SYNTHROID, LEVOTHROID) 25 mcg, Oral, Daily   • losartan (COZAAR) 50 mg, Oral, Daily   • meloxicam (MOBIC) 15 mg, Oral, Daily   • metFORMIN (GLUCOPHAGE) 500 mg, Oral, 2 Times Daily  "With Meals   • metoprolol succinate XL (TOPROL-XL) 50 mg, Oral, Daily   • raNITIdine (ZANTAC) 300 mg, Oral, 2 Times Daily          Scheduled Meds:   Continuous Infusions:EPINEPHrine, 0.02-0.3 mcg/kg/min            Social History     Socioeconomic History   • Marital status:      Spouse name: Not on file   • Number of children: 3   • Years of education: Not on file   • Highest education level: Not on file   Occupational History   • Occupation: DISABLED/ARM INJURY   Tobacco Use   • Smoking status: Former Smoker     Packs/day: 2.00     Years: 45.00     Pack years: 90.00     Types: Cigarettes     Quit date:      Years since quittin.1   • Smokeless tobacco: Current User     Types: Chew   • Tobacco comment: Quit in    Substance and Sexual Activity   • Alcohol use: No     Alcohol/week: 10.0 standard drinks     Types: 10 Cans of beer per week     Comment: no longer   • Drug use: No   • Sexual activity: Defer       Family History   Problem Relation Age of Onset   • Lung cancer Mother    • Diabetes Mother    • Coronary artery disease Father    • Hyperlipidemia Father    • Hypertension Father        REVIEW OF SYSTEMS:   Review of Systems   Unable to perform ROS: intubated            Objective:  Vitals:    21 1445 21 1448 21 1452 21 1454   BP: 114/79 114/79 97/69    Pulse:  106 105 104   Resp:       SpO2:   (!) 30% (!) 45%   Weight:       Height:         Body mass index is 31.51 kg/m².  Flowsheet Rows      First Filed Value   Admission Height  172.7 cm (68\") Documented at 2021 1435   Admission Weight  94 kg (207 lb 3.7 oz) Documented at 2021 1435        No intake or output data in the 24 hours ending 21 1518    Vitals signs reviewed.   Constitutional:       General: Sleeping.      Appearance: Normal appearance. Well-developed. Acutely ill-appearing.      Interventions: Sedated and intubated.   Eyes:      General: Lids are normal. No scleral icterus.        Right eye: " No discharge.         Left eye: No discharge.      Pupils: Pupils are equal, round, and reactive to light.   HENT:      Head: Normocephalic and atraumatic.   Neck:      Musculoskeletal: Neck supple.      Thyroid: No thyromegaly.      Vascular: No JVD.   Pulmonary:      Effort: Intubated.      Breath sounds: No wheezing. No rales.   Cardiovascular:      Tachycardia present. Regular rhythm.      Murmurs: There is no murmur.      No gallop. No click. No rub.   Pulses:     Intact distal pulses.   Edema:     Peripheral edema absent.   Abdominal:      General: There is no distension.      Palpations: Abdomen is soft. There is no abdominal mass.   Musculoskeletal:         General: No deformity.      Extremities: No clubbing present.  Skin:     General: Skin is warm and dry.   Neurological:      Mental Status: Unresponsive.         Lab Review:       Result Review:  I have personally reviewed the results from the time of admission and agree with these findings:  []  Laboratory  []  Radiology  []  EKG/Telemetry   []  Pathology  []  Old records  []  Other:  Most notable findings include:      Assessment :   1.  PEA/cardiopulmonary arrest with return to spontaneous circulation after 2 prolonged CPR/ACLS episodes.  2.  Coronary artery disease, probable non-STEMI.  May be due to demand ischemia.  3.  Paroxysmal atrial fibrillation, currently in sinus rhythm.  4.  COVID-19 pneumonia  5.  Respiratory failure  Plan:  1. Discussed with ICU attending Dr. Read who has agreed to admit the patient to the ICU to optimize his medical management.  2. We will continue full supportive care and if his hemodynamics are stable will consider induced hypothermia.  3. Cardiac standpoint he will require aspirin and intravenous heparin.  4. Further cardiac work-up will depend on recovery from respiratory failure/pneumonia and suspected anoxic encephalopathy.  5. We will follow along with the ICU team.    Scribed for Carley Ye MD by Electronically  signed by CEM Swann, 02/13/21, 3:11 PM EST.    I, Carley Ye MD, personally performed the services described in this documentation as scribed by the above named individual in my presence, and it is both accurate and complete.  2/13/2021  15:22 EST

## 2021-02-13 NOTE — PROGRESS NOTES
Intensive Care Admission Note     Cardiac arrest (CMS/HCC)    History of Present Illness     Luis Bloom is a 65 year old former smoker (90 pack years quit in 2008) with PMH significant for HTN, Dyslipidemia, DMT2, Squamous cell CA s/p RUL lobectomy 12/2016, Hiatal hernia,Cerebral aneurysms followed at [facility] s/p clipping [place] 2002 s/p coil embolization [place] 2019, CAD s/p LAD stent 7/2008 who was transferred from [facility] for acute respiratory failure, COVID PNA, and NSTEMI.      He went to the ED at [facility] on 2/12 at around 1900 hours with O2 sats in the 60's and he was started on HFNC at 50% with improvement. Initial troponin was 0.252. He was admitted and started on Remdesivir and  IV antibiotics. This morning his sats were 86-87% and he was given a nebulizer treatment which resulted in tachycardia with rate 140-160's. He developed chest pain with shortness of breath and EKG showed ST depression. He went into PEA and had BLS/ALS measures for 35 minutes prior to obtaining ROSC. He was orally intubated with 7.0 ETT and placed on mechanical ventilation with settings AC/VC 20, Vt 650, FiO2 100%, PEEP 10. ABG's pH 7.015, pCO2 37.4, pO2 63, HCO3 -20.7. He was on multiple vasopressors due to hypotension and remained hypoxic despite high FiO2 and PEEP.     He was accepted in transfer by Dr. Ye with plan to take him to the Cath Lab; however, given his hypoxemia and overall unstable condition, it was decided that he should be admitted to the ICU for further management prior to catheterization. He was on an Epinephrine drip at 100 mL/hr. Upon arrival to [facility], he again developed PEA arrest and was taken to the ED where he received ALS/BLS measures with multiple doses of Epinephrine before ROSC was achieved after approximately 17 minutes. He was transferred to ICU for further management. Post code ABG's pH 6.82, pCO2 62, pO2 61, HCO3 10.3, BE <-20 on AC/VC 20, Vt 450, FiO2 100%, PEEP 8. PEEP was  increased to 10 and he was started on a Bicarb drip.     Pt is breathing over the vent at this time.  Remains comatose currently not responding to verbal or painful stimuli.      .  Problem List, Surgical History, Family, Social History, and ROS     Patient Active Problem List    Diagnosis   • *Cardiac arrest (CMS/[facility]) [I46.9]   • Pneumonia due to COVID-19 virus [U07.1, J12.82]   • Acute respiratory failure with hypoxia (CMS/[facility]) [J96.01]   • LISANDRO (acute kidney injury) (CMS/[facility]) [N17.9]   • Elevated LFTs [R79.89]   • Lactic acidosis [E87.2]   • Acute encephalopathy [G93.40]   • Premature atrial contractions [I49.1]   • Paroxysmal atrial fibrillation (CMS/[facility]) [I48.0]   • Tobacco chew use [Z72.0]   • Murmur, cardiac [R01.1]   • Type 2 diabetes mellitus (CMS/[facility]) [E11.9]   • Former smoker - quit in 2008. [Z87.891]   • Lung cancer, squamous cell carcinoma, status post right upper lobe lobectomy 12/29/2016 [C34.90]   • CAD (coronary artery disease) [I25.10]   • Hypertension [I10]   • Dyslipidemia [E78.5]   • Obesity [E66.9]   • GERD (gastroesophageal reflux disease) [K21.9]   • Brain aneurysm [I67.1]     Past Surgical History:   Procedure Laterality Date   • APPENDECTOMY     • BRONCHOSCOPY THORACOTOMY Right 12/29/2016    Procedure: Right thoracotomy, lobectomy, Bronchoscopy;  Surgeon: Sanjay De La Fuente MD;  Location:  [facility];  Service:    • BRONCHOSCOPY THORACOTOMY Right 1/15/2017    Procedure: BRONCHOSCOPY THORACOTOMY;  Surgeon: Sanjay De La Fuente MD;  Location:  [facility];  Service:    • CEREBRAL ANEURYSM REPAIR Right     center, back of brain, coiled   • COLONOSCOPY N/A 5/30/2017    Procedure: COLONOSCOPY;  Surgeon: Tremaine Lin MD;  Location:  [facility];  Service:    • CORONARY STENT PLACEMENT  2008    X1   • KIDNEY STONE SURGERY     • LUNG LOBECTOMY Right 12/29/2016    RUL   • ORTHOPEDIC SURGERY      bilateral elbows   • OTHER SURGICAL HISTORY      Right and left elbow surgery        Allergies   Allergen  Reactions   • Fish-Derived Products      No current facility-administered medications on file prior to encounter.      Current Outpatient Medications on File Prior to Encounter   Medication Sig   • aspirin 81 MG EC tablet Take 81 mg by mouth Daily.   • atorvastatin (LIPITOR) 40 MG tablet Take 40 mg by mouth Daily.   • hydrochlorothiazide (HYDRODIURIL) 25 MG tablet Take 25 mg by mouth Daily.   • HYDROcodone-acetaminophen (NORCO)  MG per tablet Take  tablets by mouth Every 6 (Six) Hours As Needed for moderate pain (4-6).   • levothyroxine (SYNTHROID, LEVOTHROID) 25 MCG tablet Take 25 mcg by mouth Daily.   • losartan (COZAAR) 50 MG tablet Take 50 mg by mouth Daily.   • meloxicam (MOBIC) 15 MG tablet Take 15 mg by mouth Daily.   • metFORMIN (GLUCOPHAGE) 500 MG tablet Take 500 mg by mouth 2 (Two) Times a Day With Meals.   • metoprolol succinate XL (TOPROL-XL) 50 MG 24 hr tablet Take 1 tablet by mouth Daily.   • raNITIdine (ZANTAC) 300 MG tablet Take 300 mg by mouth 2 (Two) Times a Day.     MEDICATION LIST AND ALLERGIES REVIEWED.    Family History   Problem Relation Age of Onset   • Lung cancer Mother    • Diabetes Mother    • Coronary artery disease Father    • Hyperlipidemia Father    • Hypertension Father      Social History     Tobacco Use   • Smoking status: Former Smoker     Packs/day: 2.00     Years: 45.00     Pack years: 90.00     Types: Cigarettes     Quit date:      Years since quittin.1   • Smokeless tobacco: Current User     Types: Chew   • Tobacco comment: Quit in    Substance Use Topics   • Alcohol use: No     Alcohol/week: 10.0 standard drinks     Types: 10 Cans of beer per week     Comment: no longer   • Drug use: No     Social History     Social History Narrative   • Not on file     FAMILY AND SOCIAL HISTORY REVIEWED.    Review of Systems   Unable to perform ROS: Intubated       Physical Exam and Clinical Information   BP (!) 87/26   Pulse 104   Temp (!) 91 °F (32.8 °C)  "(Axillary) Comment: Dr. Read notified  Resp 22 Comment: assisted per intubation bagged  Ht 172.7 cm (68\")   Wt 107 kg (234 lb 12.6 oz)   SpO2 (!) 84%   BMI 35.70 kg/m²   Physical Exam     General Appearance: Tachypnea noted with moderate respiratory distress on ventilator, no asynchrony noted.  Head:    Atraumatic, Normocephalic, Pupils about 4 mm bilateral, nonreactive to light.  Neck:   Endotracheal tube clean.   Lungs:   B/L Breath sounds present with decreased breath sounds on bases, no wheezing heard, occ crackles.  There seems to be flail chest with possible sternal fracture  Heart: S1 and S2 present, tachycardia  Abdomen: Soft, nontender, no guarding or rigidity, bowel sounds positive.  Extremities: Atraumaticno, no cyanosis or clubbing,  no edema, cool to touch.  Pulses: Positive and symmetric.  Capillary refill: >3s  Neurologic:  Pt on the vent. Not able to participate in full neurological exam  Ventilator settings: A/C: FiO2 100 /RR 20/PEEP 10/TV  450                                 Peak pressure 30/plateau pressure 27/minute ventilation 13.5       Results from last 7 days   Lab Units 02/13/21  1450   WBC 10*3/mm3 34.40*   HEMOGLOBIN g/dL 10.9*   PLATELETS 10*3/mm3 303     Results from last 7 days   Lab Units 02/13/21  1450   SODIUM mmol/L 151*   POTASSIUM mmol/L 4.9   CO2 mmol/L 14.0*   BUN mg/dL 44*   CREATININE mg/dL 2.10*   MAGNESIUM mg/dL 2.7*   PHOSPHORUS mg/dL 10.5*   GLUCOSE mg/dL 187*     Estimated Creatinine Clearance: 40.5 mL/min (A) (by C-G formula based on SCr of 2.1 mg/dL (H)).      Results from last 7 days   Lab Units 02/13/21  1535   PH, ARTERIAL pH units 6.829*   PCO2, ARTERIAL mm Hg 62.1*   PO2 ART mm Hg 61.7*     Lab Results   Component Value Date    LACTATE 14.9 (C) 02/13/2021        Images: Xr Chest 1 View    Result Date: 2/13/2021  1. ET tube NG tube and right IJ catheter appear to be in good position. 2. left upper lobe pneumonia. 3. No visible pneumothorax.          I " reviewed the patient's results and images.     Impression       Cardiac arrest (CMS/HCC)    CAD (coronary artery disease)    Type 2 diabetes mellitus (CMS/HCC)    Pneumonia due to COVID-19 virus    Acute respiratory failure with hypoxia (CMS/HCC)    LISANDRO (acute kidney injury) (CMS/[facility])    Elevated LFTs    Lactic acidosis    Acute encephalopathy    Plan/Recommendations     65-year-old male with past medical history of stage IIa squamous cell lung cancer status post right upper lobectomy, hypertension, diabetes, coronary disease status post stent, cerebral aneurysm status post coiling and clipping with few aneurysm remained untreated.  Patient presenting today after cardiac arrest in the outside facility.  Patient was admitted there yesterday with worsening symptoms due to COVID-19 infection.  Patient's wife came positive of COVID-19 about a week ago and 2 to 3 days later patient started having symptoms of fever and fatigue which progressed to worsening shortness of breath.  Patient was seen in outside ED and admitted to the hospital on supplemental oxygen according to family patient's condition deteriorated and he was transferred to ICU overnight.  Patient was found to have elevated troponin levels.  Cardiology team was contacted here and initially plan was to bring him to the Cath Lab for coronary angiogram with ICU admission.  However, patient deteriorated and underwent cardiac arrest requiring 35 minutes of resuscitation in the outside facility with ROSC obtained.  Patient apparently had ST depression in the lateral leads.  Patient was hypoxic despite 100% oxygen on mechanical ventilation but decision was made to transfer patient by ground EMS to [facility].  Patient was on 100 mL/h of epinephrine infusion during the transport.  When patient arrived to [facility] he again developed PEA cardiac arrest and required 17 minutes of CPR.  Return of spontaneous circulation obtained and patient was transferred to  ICU for further care.    1.  Admit to intensive care unit in critically ill and guarded condition.  2.  Due to prolonged nature of cardiac arrest will proceed with TTM to 36 degrees C.  If unable to tolerate hemodynamically may need to back off.  Unfortunately not able to get CT studies of brain and chest prior to targeted temperature management due to significant hemodynamic and respiratory instability.  3.  Patient is severely acidotic with both metabolic and respiratory component.  Lungs are stiff with elevated plateau pressures.  Ventilator adjustments made where we have plateau pressures are acceptable now.  Increase PEEP to 10 and will follow blood gases closely and make further adjustments as needed.  4.  Bicarb infusion to counteract significant systemic acidosis.  5.  Cannot rule out pulmonary embolism and also concern for ACS.  Will continue IV heparin infusion and follow trending levels.  Cardiology team wants to hold off on further coronary intervention.  6.  Epinephrine/Norepinephrine drip titrate for SBP > 90 mmHg.  7.  For severe COVID-19 infection we will initiate remdesivir/Decadron 6 mg IV x 10 days  8.  Patient has severe elevation in WBC count.  Superimposed bacterial infection cannot be ruled out.  Will treat with broad-spectrum antibiotics including vancomycin/Zosyn, de-escalate as able  9.  Blood cultures x2/sputum culture  10.  Follow COVID labs  11.  Respiratory panel  12.  Urinary antigens  13.  Patient is also in acute renal failure.  Will monitor urine output closely.  Monitor electrolytes.  14.  Patient with significant transaminitis likely shock liver.  Will follow levels daily.  15.  Monitor blood glucose and insulin as needed for hyperglycemia management.  16.  Met with patient's wife and daughter and updated them about patient's current extremely guarded condition and overall.  Poor prognosis given multiple cardiac arrests and concern for significant anoxic encephalopathy.  They  would want patient to be treated aggressively for now but would agree with DNR CODE STATUS.      Case discussed extensively with Dr. Ye from cardiology.    Gisell Wild APRN, [facility]  Pulmonary & Critical Care Medicine     I have seen and examined patient, performing a face-to-face diagnostic evaluation with plan of care reviewed and developed with APRN and nursing staff. I have addended and modified the above history of present illness, conducted physical examination and documented as above, and assessment and plan to reflect my findings and impressions.     Level of Risk High due to:  illness with threat to life or bodily function      Time spent Critical care 50 min (exclusive of procedure time)  including high complexity decision making to assess, manipulate, and support vital organ system failure in this individual who has impairment of one or more vital organ systems such that there is a high probability of imminent or life threatening deterioration in the patient’s condition.      Marcus Read MD, [facility]  Pulmonary and Critical Care Medicine  02/13/21 17:08 EST           CC: Meir Betancur MD

## 2021-02-14 NOTE — SIGNIFICANT NOTE
Exam confirms with auscultation zero audible heart tones and zero audible respirations. Mr.Arlie ANTHONY Bloom was pronounced dead at 0217.  MD notified by Patient's RN.    Vicky Ritter RN  Clinical House Supervisor  2/14/2021 02:55 EST

## 2021-02-14 NOTE — PLAN OF CARE
Goal Outcome Evaluation:           Problem: Adult Inpatient Plan of Care  Goal: Readiness for Transition of Care  2/14/2021 0622 by Braxton Larson, RN  Outcome: Met  2/14/2021 0618 by Braxton Larson, RN  Outcome: Unable to Meet, Plan Revised

## 2021-02-14 NOTE — SIGNIFICANT NOTE
Called by charge nurse regarding patients continued hypotension despite 3 vasoactive drips at max rate. Additionally, bedside nurse is having to manually bag the patient to keep O2 sats from dropping into the 30's. After speaking with Dr. Neville, I called and spoke to patients wife. I detailed that although we could add one additional vasoactive medication, we did not feel it would ultimately change the outcome of this patients critical illness and that on the off chance it did help, the likelihood that he would have life long effects from his persistent hypotension and hypoxia were significant. She noted he would not want that. I noted that while the patient remained full support until cardiac standstill, hospital policy did not allow for visitors. I told her if we decided to make him comfort care we could let her and her three children come to the hospital to visit with him while he was passing. She was agreeable and they are on their way.

## 2021-02-14 NOTE — DISCHARGE SUMMARY
Death Summary         Patient Name: Luis Bloom    CSN: [id number]    MRN: [id number]    : 1955    Today's Date: 21    Date of Admission: 2021    Date/Time of Death: 2021 @ 0217    Admitting Physician:  Marcus Read MD    Primary Care Provider: Meir Betancur MD    Consultations:     Consults     No orders found for last 30 day(s).          Admission Diagnosis:  Cardiac arrest (CMS/[facility]) [I46.9]    Diagnoses at Time of Death:     Cardiac arrest (CMS/[facility])    CAD (coronary artery disease)    Type 2 diabetes mellitus (CMS/[facility])    Pneumonia due to COVID-19 virus    Acute respiratory failure with hypoxia (CMS/[facility])    LISANDRO (acute kidney injury) (CMS/[facility])    Elevated LFTs    Lactic acidosis    Acute encephalopathy        Procedures:    HPI     History of Present Illness:    Luis Bloom was a 65 year old former smoker (90 pack years quit in ) with PMH significant for HTN, Dyslipidemia, DMT2, Squamous cell CA s/p RUL lobectomy 2016, Hiatal hernia,Cerebral aneurysms followed at [facility] s/p clipping [place]  s/p coil embolization [place] , CAD s/p LAD stent 2008 who was transferred from [facility] for acute respiratory failure, COVID PNA, and NSTEMI.      He went to the ED at [facility] on  at around 1900 hours with O2 sats in the 60's and he was started on HFNC at 50% with improvement. Initial troponin was 0.252. He was admitted and started on Remdesivir and  IV antibiotics. This morning his sats were 86-87% and he was given a nebulizer treatment which resulted in tachycardia with rate 140-160's. He developed chest pain with shortness of breath and EKG showed ST depression. He went into PEA and had BLS/ALS measures for 35 minutes prior to obtaining ROSC. He was orally intubated with 7.0 ETT and placed on mechanical ventilation with settings AC/VC 20, Vt 650, FiO2 100%, PEEP 10. ABG's pH 7.015, pCO2 37.4, pO2 63, HCO3 -20.7. He was on multiple  vasopressors due to hypotension and remained hypoxic despite high FiO2 and PEEP.      He was accepted in transfer by Dr. Ye with plan to take him to the Cath Lab; however, given his hypoxemia and overall unstable condition, it was decided that he should be admitted to the ICU for further management prior to catheterization. He was on an Epinephrine drip at 100 mL/hr. Upon arrival to [facility], he again developed PEA arrest and was taken to the ED where he received ALS/BLS measures with multiple doses of Epinephrine before ROSC was achieved after approximately 17 minutes. He was transferred to ICU for further management. Post code ABG's pH 6.82, pCO2 62, pO2 61, HCO3 10.3, BE <-20 on AC/VC 20, Vt 450, FiO2 100%, PEEP 8. PEEP was increased to 10 and he was started on a Bicarb drip.      Hospital Course       Hospital Course  On arrival to ICU, patient was over breathing the vent but comatose without response to verbal or painful stimuli. He was continued on Levophed and Epinephrine, as well as, Heparin and Fentanyl drips. Targeted temperature management was initiated but with his hypothermia he did not require cooling. He was given Dexamethasone, Remdesivir, Vanc and Zosyn. He remained significantly acidotic so was initiated on HCO3 drip. Follow up ABG remained acidotic despite drip so 2 amps of HCO2 were pushed and drip was increased to 150 ml/hr. Despite being on 3 maxed vasoactive medications he was continuously hypotensive so Neosynepherine drip was started. Throughout this time he remained hypoxic despite being on 100% FIo2 with Peep 8-12. Around 2200 he developed worsening hypoxia with Spo2 down into the 30's. He did receive bagging for short period of time which improved his O2Sats but his hypotension persisted. Family was called and updated around 2300. They came into make him comfort care only.  Vasoactive medications and Heparin were stopped at 0027. Mr. Luis Bloom passed away peacefully on 2/14/2021 at  0217 with family by his side.     Sofia Schultz, JUSTIN  Pulmonary & Critical Care Medicine                    *Please note that portions of this note were completed with a voice recognition program. Efforts were made to edit the dictations, but occasionally words are mistranscribed.     Reviewed above.

## 2021-02-14 NOTE — SIGNIFICANT NOTE
On family's arrival I was able to meet them at bedside. I offered my suggestions with how to proceed with withdrawal of care. Patient is currently on Fentanyl drip. That will continue to run. Once the family is ready, nurse will turn off vasoactive, insulin and heparin drips. At that point I suspect he will pass as his current required vasoactive doses are all maxed. If patient does not pass we will reconsider terminal extubation although this would be after a prolonged period of time as extubating a COVID 19 patient comes with obvious risks. They were all in agreeable to plan.  Will monitor situation.

## 2021-02-18 LAB
BACTERIA SPEC AEROBE CULT: NORMAL
BACTERIA SPEC AEROBE CULT: NORMAL

## (undated) DEVICE — MEDI-VAC NON-CONDUCTIVE SUCTION TUBING: Brand: CARDINAL HEALTH

## (undated) DEVICE — ADAPT SWVL FIBROPTIC BRONCH

## (undated) DEVICE — 2000CC GUARDIAN II: Brand: GUARDIAN

## (undated) DEVICE — SPNG GZ WOVN 4X4IN 12PLY 10/BX STRL

## (undated) DEVICE — TUBING, SUCTION, 1/4" X 10', STRAIGHT: Brand: MEDLINE

## (undated) DEVICE — TOTAL TRAY, URNMTR, SILV, LF, 16FR 10ML: Brand: MEDLINE

## (undated) DEVICE — PAD GRND REM POLYHESIVE A/ DISP

## (undated) DEVICE — MEDI-VAC YANKAUER SUCTION HANDLE W/BULBOUS TIP: Brand: CARDINAL HEALTH

## (undated) DEVICE — SINGLE USE SUCTION VALVE MAJ-209: Brand: SINGLE USE SUCTION VALVE (STERILE)

## (undated) DEVICE — OASIS DRAIN, SINGLE, INLINE & ATS COMPATIBLE: Brand: OASIS

## (undated) DEVICE — ENCORE® LATEX MICRO SIZE 7, STERILE LATEX POWDER-FREE SURGICAL GLOVE: Brand: ENCORE

## (undated) DEVICE — WIPE THERAWASH SLV SPEC CARE 2PK

## (undated) DEVICE — Device

## (undated) DEVICE — SAFESECURE,SECUREMENT,FOLEY CATH,STERILE: Brand: MEDLINE

## (undated) DEVICE — APPL CHLORAPREP W/TINT 26ML BLU

## (undated) DEVICE — ENDOGATOR AUXILIARY WATER JET CONNECTOR: Brand: ENDOGATOR

## (undated) DEVICE — CONN TBG Y 5 IN 1 LF STRL

## (undated) DEVICE — TRY EPID CONT FLO

## (undated) DEVICE — SINGLE USE BIOPSY VALVE MAJ-210: Brand: SINGLE USE BIOPSY VALVE (STERILE)

## (undated) DEVICE — AIRWY SZ11

## (undated) DEVICE — UNDYED BRAIDED (POLYGLACTIN 910), SYNTHETIC ABSORBABLE SUTURE: Brand: COATED VICRYL

## (undated) DEVICE — SUT VIC 4/0 SH 27IN J415H

## (undated) DEVICE — SUT PROLN 3/0 SH D/A 36IN 8522H

## (undated) DEVICE — TRAP,MUCUS SPECIMEN,40CC: Brand: MEDLINE

## (undated) DEVICE — DRSNG WND GZ PAD BORDERED 4X8IN STRL

## (undated) DEVICE — PK THORACOTOMY 10